# Patient Record
Sex: FEMALE | Race: WHITE | Employment: UNEMPLOYED | URBAN - METROPOLITAN AREA
[De-identification: names, ages, dates, MRNs, and addresses within clinical notes are randomized per-mention and may not be internally consistent; named-entity substitution may affect disease eponyms.]

---

## 2017-06-13 ENCOUNTER — OFFICE VISIT (OUTPATIENT)
Dept: OBGYN CLINIC | Age: 57
End: 2017-06-13

## 2017-06-13 VITALS — BODY MASS INDEX: 22.14 KG/M2 | DIASTOLIC BLOOD PRESSURE: 82 MMHG | SYSTOLIC BLOOD PRESSURE: 126 MMHG | WEIGHT: 129 LBS

## 2017-06-13 DIAGNOSIS — Z01.419 ENCOUNTER FOR GYNECOLOGICAL EXAMINATION WITHOUT ABNORMAL FINDING: Primary | ICD-10-CM

## 2017-06-13 RX ORDER — ESZOPICLONE 1 MG/1
1 TABLET, FILM COATED ORAL
Qty: 30 TAB | Refills: 2 | Status: SHIPPED | OUTPATIENT
Start: 2017-06-13 | End: 2019-10-15

## 2017-06-13 RX ORDER — DEXLANSOPRAZOLE 60 MG/1
CAPSULE, DELAYED RELEASE ORAL
COMMUNITY

## 2017-06-13 NOTE — PATIENT INSTRUCTIONS
Pelvic Exam: Care Instructions  Your Care Instructions    When your doctor examines all of your pelvic organs, it's called a pelvic exam. Two good reasons to have this kind of exam are to check for sexually transmitted infections (STIs) and to get a Pap test. A Pap test is also called a Pap smear. It checks for early changes that can lead to cancer of the cervix. Sometimes a pelvic exam is part of a regular checkup. In this case, you can do some things to make your test results as accurate as possible. · Try to schedule the exam when you don't have your period. · Don't use douches, tampons, or vaginal medicines, sprays, or powders for 24 hours before your exam.  · Don't have sex for 24 hours before your exam.  Other times, women have this kind of exam at any time of the month. This is because they have pelvic pain, bleeding, or discharge. Or they may have another pelvic problem. Before your exam, it's important to share some information with your doctor. For example, if you are a survivor of rape or sexual abuse, you can talk about any concerns you may have. Your doctor will also want to know if you are pregnant or use birth control. And he or she will want to hear about any problems, surgeries, or procedures you have had in your pelvic area. You will also need to tell your doctor when your last period was. Follow-up care is a key part of your treatment and safety. Be sure to make and go to all appointments, and call your doctor if you are having problems. It's also a good idea to know your test results and keep a list of the medicines you take. How is a pelvic exam done? · During a pelvic exam, you will:  ¨ Take off your clothes below the waist. You will get a paper or cloth cover to put over the lower half of your body. Cherylyn Peck on your back on an exam table. Your feet will be raised above you. Stirrups will support your feet. · The doctor will:  Javad Octave you to relax your knees.  Your knees need to lean out, toward the walls. ¨ Check the opening of your vagina for sores or swelling. ¨ Gently put a tool called a speculum into your vagina. It opens the vagina a little bit. You will feel some pressure. But if you are relaxed, it will not hurt. It lets your doctor see inside the vagina. ¨ Use a small brush, spatula, or swab to get a sample of cells, if you are having a Pap test or culture. The doctor then removes the speculum. ¨ Put on gloves and put one or two fingers of one hand into your vagina. The other hand goes on your lower belly. This lets your doctor feel your pelvic organs. You will probably feel some pressure. Try to stay relaxed. ¨ Put one gloved finger into your rectum and one into your vagina, if needed. This can also help check your pelvic organs. This exam takes about 10 minutes. At the end, you will get a washcloth or tissue to clean your vaginal area. It's normal to have some discharge after this exam. You can then get dressed. Some test results may be ready right away. But results from a culture or a Pap test may take several days or a few weeks. Why should you have a pelvic exam?  · You want to have recommended screening tests. This includes a Pap test.  · You think you have a vaginal infection. Signs include itching, burning, or unusual discharge. · You might have been exposed to a sexually transmitted infection (STI), such as chlamydia or herpes. · You have vaginal bleeding that is not part of your normal menstrual period. · You have pain in your belly or pelvis. · You have been sexually assaulted. A pelvic exam lets your doctor collect evidence and check for STIs. · You are pregnant. · You are having trouble getting pregnant. What are the risks of a pelvic exam?  There are no risks from a pelvic exam.  When should you call for help?   Watch closely for changes in your health, and be sure to contact your doctor if:  · You have heavy bleeding or discharge from your vagina after the exam.  Where can you learn more? Go to http://alisha-dominik.info/. Enter Z368 in the search box to learn more about \"Pelvic Exam: Care Instructions. \"  Current as of: October 13, 2016  Content Version: 11.2  © 0730-8368 [employer], [employer]. Care instructions adapted under license by [employer] (which disclaims liability or warranty for this information). If you have questions about a medical condition or this instruction, always ask your healthcare professional. [address] any warranty or liability for your use of this information.

## 2017-06-13 NOTE — MR AVS SNAPSHOT
Visit Information Date & Time Provider Department Dept. Phone Encounter #  
 9/45/6032 50:17 AM Vanessa Jimenez MD [facility] OB-GYN [address] [facility] [id number] Upcoming Health Maintenance Date Due Hepatitis C Screening 1960 PAP AKA CERVICAL CYTOLOGY 12/29/1981 BREAST CANCER SCRN MAMMOGRAM 12/29/2010 FOBT Q 1 YEAR AGE 50-75 12/29/2010 INFLUENZA AGE 9 TO ADULT 8/1/2017 Allergies as of 6/13/2017  Review Complete On: 6/13/2017 By: Andry Crews No Known Allergies Current Immunizations  Never Reviewed No immunizations on file. Not reviewed this visit You Were Diagnosed With   
  
 Codes Comments Encounter for gynecological examination without abnormal finding    -  Primary ICD-10-CM: X91.804 ICD-9-CM: V72.31 Vitals OB Status Smoking Status Postmenopausal Never Smoker Your Updated Medication List  
  
   
This list is accurate as of: 6/13/17 10:14 AM.  Always use your most recent med list.  
  
  
  
  
 aspirin 81 mg chewable tablet Take 1 Tab by mouth daily. DEXILANT 60 mg Cpdb Generic drug:  Dexlansoprazole Take  by mouth. omeprazole 20 mg capsule Commonly known as:  PriLOSEC Take 1 Cap by mouth daily. ondansetron 8 mg disintegrating tablet Commonly known as:  ZOFRAN ODT Take 1 Tab by mouth every eight (8) hours as needed for Nausea. PREMPRO 0.3-1.5 mg Tab Generic drug:  estrogen (conjugated)-medroxyPROGESTERone Take 1 Tab by mouth daily. RITALIN 10 mg tablet Generic drug:  methylphenidate Take 10 mg by mouth three (3) times daily. ZyrTEC 10 mg tablet Generic drug:  cetirizine Take 10 mg by mouth daily. Patient Instructions Pelvic Exam: Care Instructions Your Care Instructions When your doctor examines all of your pelvic organs, it's called a pelvic exam. Two good reasons to have this kind of exam are to check for sexually transmitted infections (STIs) and to get a Pap test. A Pap test is also called a Pap smear. It checks for early changes that can lead to cancer of the cervix. Sometimes a pelvic exam is part of a regular checkup. In this case, you can do some things to make your test results as accurate as possible. · Try to schedule the exam when you don't have your period. · Don't use douches, tampons, or vaginal medicines, sprays, or powders for 24 hours before your exam. 
· Don't have sex for 24 hours before your exam. 
Other times, women have this kind of exam at any time of the month. This is because they have pelvic pain, bleeding, or discharge. Or they may have another pelvic problem. Before your exam, it's important to share some information with your doctor. For example, if you are a survivor of rape or sexual abuse, you can talk about any concerns you may have. Your doctor will also want to know if you are pregnant or use birth control. And he or she will want to hear about any problems, surgeries, or procedures you have had in your pelvic area. You will also need to tell your doctor when your last period was. Follow-up care is a key part of your treatment and safety. Be sure to make and go to all appointments, and call your doctor if you are having problems. It's also a good idea to know your test results and keep a list of the medicines you take. How is a pelvic exam done? · During a pelvic exam, you will: ¨ Take off your clothes below the waist. You will get a paper or cloth cover to put over the lower half of your body. Jen Mcqueen on your back on an exam table. Your feet will be raised above you. Stirrups will support your feet. · The doctor will: ¨ Ask you to relax your knees. Your knees need to lean out, toward the walls. ¨ Check the opening of your vagina for sores or swelling. ¨ Gently put a tool called a speculum into your vagina. It opens the vagina a little bit. You will feel some pressure. But if you are relaxed, it will not hurt. It lets your doctor see inside the vagina. ¨ Use a small brush, spatula, or swab to get a sample of cells, if you are having a Pap test or culture. The doctor then removes the speculum. ¨ Put on gloves and put one or two fingers of one hand into your vagina. The other hand goes on your lower belly. This lets your doctor feel your pelvic organs. You will probably feel some pressure. Try to stay relaxed. ¨ Put one gloved finger into your rectum and one into your vagina, if needed. This can also help check your pelvic organs. This exam takes about 10 minutes. At the end, you will get a washcloth or tissue to clean your vaginal area. It's normal to have some discharge after this exam. You can then get dressed. Some test results may be ready right away. But results from a culture or a Pap test may take several days or a few weeks. Why should you have a pelvic exam? 
· You want to have recommended screening tests. This includes a Pap test. 
· You think you have a vaginal infection. Signs include itching, burning, or unusual discharge. · You might have been exposed to a sexually transmitted infection (STI), such as chlamydia or herpes. · You have vaginal bleeding that is not part of your normal menstrual period. · You have pain in your belly or pelvis. · You have been sexually assaulted. A pelvic exam lets your doctor collect evidence and check for STIs. · You are pregnant. · You are having trouble getting pregnant. What are the risks of a pelvic exam? 
There are no risks from a pelvic exam. 
When should you call for help? Watch closely for changes in your health, and be sure to contact your doctor if: 
· You have heavy bleeding or discharge from your vagina after the exam. 
Where can you learn more? Go to http://alisha-dominik.info/. Enter T015 in the search box to learn more about \"Pelvic Exam: Care Instructions. \" Current as of: October 13, 2016 Content Version: 11.2 © 8433-6851 [employer]. Care instructions adapted under license by [employer] (which disclaims liability or warranty for this information). If you have questions about a medical condition or this instruction, always ask your healthcare professional. [address] any warranty or liability for your use of this information. Introducing [facility]! Moe Amin introduces Youtego patient portal. Now you can access parts of your medical record, email your doctor's office, and request medication refills online. 1. In your internet browser, go to [email or web address] 2. Click on the First Time User? Click Here link in the Sign In box. You will see the New Member Sign Up page. 3. Enter your Youtego Access Code exactly as it appears below. You will not need to use this code after youve completed the sign-up process. If you do not sign up before the expiration date, you must request a new code. · Youtego Access Code: [email or web address] Expires: 9/11/2017 10:14 AM 
 
4. Enter the last four digits of your Social Security Number (xxxx) and Date of Birth (mm/dd/yyyy) as indicated and click Submit. You will be taken to the next sign-up page. 5. Create a Youtego ID. This will be your Youtego login ID and cannot be changed, so think of one that is secure and easy to remember. 6. Create a Youtego password. You can change your password at any time. 7. Enter your Password Reset Question and Answer. This can be used at a later time if you forget your password. 8. Enter your e-mail address. You will receive e-mail notification when new information is available in [address] 9. Click Sign Up. You can now view and download portions of your medical record. 10. Click the Download Summary menu link to download a portable copy of your medical information. If you have questions, please visit the Frequently Asked Questions section of the [employer] website. Remember, [employer] is NOT to be used for urgent needs. For medical emergencies, dial 911. Now available from your iPhone and Android! Please provide this summary of care documentation to your next provider. Your primary care clinician is listed as Danny Felty. If you have any questions after today's visit, please call [phone].

## 2017-06-13 NOTE — PROGRESS NOTES
Annual exam ages 40-58    Marck Allen is a No obstetric history on file. ,  64 y.o. female [facility] No LMP recorded. Patient is postmenopausal..    She presents for her annual checkup. She is having no significant problems. Wants to remain on current ERT; understands risks and benefits    Son now driving; age 12     Menstrual status:    Now menopausal      Sexual history:    She  reports that she currently engages in sexual activity and has had male partners. Medical conditions:    Since her last annual GYN exam about one year ago, she has not the following changes in her health history: none. Pap and Mammogram History:    Her most recent Pap smear was normal, obtained 1 year(s) ago. The patient had a recent mammogram   which was negative for malignancy. Breast Cancer History/Substance Abuse: negative    Osteoporosis History:    Family history does not include a first or second degree relative with osteopenia or osteoporosis. Past Medical History:   Diagnosis Date    ADHD (attention deficit hyperactivity disorder)     Delivery normal     GERD (gastroesophageal reflux disease)      Past Surgical History:   Procedure Laterality Date    HX BREAST AUGMENTATION      HX OTHER SURGICAL      tooth implant    HX OTHER SURGICAL      cerclage    HX OTHER SURGICAL      knee surgery    HX WISDOM TEETH EXTRACTION         Current Outpatient Prescriptions   Medication Sig Dispense Refill    Dexlansoprazole (DEXILANT) 60 mg CpDB Take  by mouth.  estrogen, conjugated,-medroxyPROGESTERone (PREMPRO) 0.3-1.5 mg per tablet Take 1 Tab by mouth daily.  methylphenidate (RITALIN) 10 mg tablet Take 10 mg by mouth three (3) times daily. Allergies: Review of patient's allergies indicates no known allergies. Tobacco History:  reports that she has never smoked. She does not have any smokeless tobacco history on file. Alcohol Abuse:  reports that she drinks alcohol.   Drug Abuse: reports that she does not use illicit drugs. Family Medical/Cancer History: No family history on file.      Review of Systems - History obtained from the patient  Constitutional: negative for weight loss, fever, night sweats  HEENT: negative for hearing loss, earache, congestion, snoring, sorethroat  CV: negative for chest pain, palpitations, edema  Resp: negative for cough, shortness of breath, wheezing  GI: negative for change in bowel habits, abdominal pain, black or bloody stools  : negative for frequency, dysuria, hematuria, vaginal discharge  MSK: negative for back pain, joint pain, muscle pain  Breast: negative for breast lumps, nipple discharge, galactorrhea  Skin :negative for itching, rash, hives  Neuro: negative for dizziness, headache, confusion, weakness  Psych: negative for anxiety, depression, change in mood  Heme/lymph: negative for bleeding, bruising, pallor    Physical Exam    Visit Vitals    /82 (BP 1 Location: Left arm, BP Patient Position: Sitting)    Wt 129 lb (58.5 kg)    BMI 22.14 kg/m2       Constitutional  · Appearance: well-nourished, well developed, alert, in no acute distress    HENT  · Head and Face: appears normal    Neck  · Inspection/Palpation: normal appearance, no masses or tenderness  · Lymph Nodes: no lymphadenopathy present  · Thyroid: gland size normal, nontender, no nodules or masses present on palpation    Chest  · Respiratory Effort: breathing unlabored      Breasts  · Inspection of Breasts: breasts symmetrical, no skin changes, no discharge present, nipple appearance normal, no skin retraction present  · Palpation of Breasts and Axillae: no masses present on palpation, no breast tenderness  · Axillary Lymph Nodes: no lymphadenopathy present    Gastrointestinal  · Abdominal Examination: abdomen non-tender to palpation, normal bowel sounds, no masses present  · Liver and spleen: no hepatomegaly present, spleen not palpable  · Hernias: no hernias identified    Skin  · General Inspection: no rash, no lesions identified    Neurologic/Psychiatric  · Mental Status:  · Orientation: grossly oriented to person, place and time  · Mood and Affect: mood normal, affect appropriate    Genitourinary  · External Genitalia: normal appearance for age, no discharge present, no tenderness present, no inflammatory lesions present, no masses present, no atrophy present  · Vagina: normal vaginal vault without central or paravaginal defects, no discharge present, no inflammatory lesions present, no masses present  · Bladder: non-tender to palpation  · Urethra: appears normal  · Cervix: normal   · Uterus: normal size, shape and consistency  · Adnexa: no adnexal tenderness present, no adnexal masses present  · Perineum: perineum within normal limits, no evidence of trauma, no rashes or skin lesions present  · Anus: anus within normal limits, no hemorrhoids present  · Inguinal Lymph Nodes: no lymphadenopathy present    Assessment:  Routine gynecologic examination  Her current medical status is satisfactory with no evidence of significant gynecologic issues.     Plan:  Counseled re: diet, exercise, healthy lifestyle  Return for yearly wellness visits  Rec annual mammogram

## 2017-06-16 LAB
CYTOLOGIST CVX/VAG CYTO: NORMAL
CYTOLOGY CVX/VAG DOC THIN PREP: NORMAL
DX ICD CODE: NORMAL
LABCORP, 190119: NORMAL
Lab: NORMAL
Lab: NORMAL
OTHER STN SPEC: NORMAL
PATH REPORT.FINAL DX SPEC: NORMAL
STAT OF ADQ CVX/VAG CYTO-IMP: NORMAL

## 2018-07-27 ENCOUNTER — TELEPHONE (OUTPATIENT)
Dept: OBGYN CLINIC | Age: 58
End: 2018-07-27

## 2018-07-27 NOTE — TELEPHONE ENCOUNTER
Call received at 1:48PM      62year old patient last seen in the office on 6/13/117. Patient has appointment scheduled on 8/10/18 for AE. Patient calling to request a refill on her Prempro to get her to her appointment. Prescription sent as per MD order to patient preferred pharmacy for one month supply. Patient advised of need to keep appointment in order to get further refills on the prescription. Patient verbalized understanding.

## 2018-08-21 ENCOUNTER — OFFICE VISIT (OUTPATIENT)
Dept: OBGYN CLINIC | Age: 58
End: 2018-08-21

## 2018-08-21 VITALS
HEIGHT: 65 IN | BODY MASS INDEX: 21.63 KG/M2 | DIASTOLIC BLOOD PRESSURE: 78 MMHG | WEIGHT: 129.8 LBS | SYSTOLIC BLOOD PRESSURE: 124 MMHG

## 2018-08-21 DIAGNOSIS — Z01.419 WELL WOMAN EXAM: Primary | ICD-10-CM

## 2018-08-21 NOTE — PROGRESS NOTES
Annual exam ages 40-58    Marlyce Opitz is a No obstetric history on file. ,  62 y.o. female [facility] No LMP recorded. Patient is postmenopausal..    She presents for her annual checkup. She is having no significant problems. 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 Contraception:    The current method of family planning is none. Sexual history:    She  reports that she currently engages in sexual activity and has had male partners. She reports using the following method of birth control/protection: None. Medical conditions:    Since her last annual GYN exam about one year ago, she has not the following changes in her health history: none. Pap and Mammogram History:    Her most recent Pap smear was normal, obtained 1 year(s) ago. The patient has not had a recent mammogram.    Breast Cancer History/Substance Abuse: negative    Osteoporosis History:    Family history does not include a first or second degree relative with osteopenia or osteoporosis. Past Medical History:   Diagnosis Date    ADHD (attention deficit hyperactivity disorder)     Delivery normal     GERD (gastroesophageal reflux disease)      Past Surgical History:   Procedure Laterality Date    HX BREAST AUGMENTATION      HX OTHER SURGICAL      tooth implant    HX OTHER SURGICAL      cerclage    HX OTHER SURGICAL      knee surgery    HX WISDOM TEETH EXTRACTION         Current Outpatient Prescriptions   Medication Sig Dispense Refill    estrogen, conjugated,-medroxyPROGESTERone (PREMPRO) 0.3-1.5 mg tab Take 1 Tab by mouth daily. 30 Tab 0    Dexlansoprazole (DEXILANT) 60 mg CpDB Take  by mouth.  eszopiclone (LUNESTA) 1 mg tablet Take 1 Tab by mouth nightly as needed for Sleep. Max Daily Amount: 1 mg. 30 Tab 2    methylphenidate (RITALIN) 10 mg tablet Take 10 mg by mouth three (3) times daily. Allergies: Review of patient's allergies indicates no known allergies. Tobacco History:  reports that she has never smoked. She has never used smokeless tobacco.  Alcohol Abuse:  reports that she drinks alcohol. Drug Abuse:  reports that she does not use illicit drugs.     Family Medical/Cancer History:   Family History   Problem Relation Age of Onset    No Known Problems Mother     No Known Problems Father         Review of Systems - History obtained from the patient  Constitutional: negative for weight loss, fever, night sweats  HEENT: negative for hearing loss, earache, congestion, snoring, sorethroat  CV: negative for chest pain, palpitations, edema  Resp: negative for cough, shortness of breath, wheezing  GI: negative for change in bowel habits, abdominal pain, black or bloody stools  : negative for frequency, dysuria, hematuria, vaginal discharge  MSK: negative for back pain, joint pain, muscle pain  Breast: negative for breast lumps, nipple discharge, galactorrhea  Skin :negative for itching, rash, hives  Neuro: negative for dizziness, headache, confusion, weakness  Psych: negative for anxiety, depression, change in mood  Heme/lymph: negative for bleeding, bruising, pallor    Physical Exam    Visit Vitals    /78 (BP 1 Location: Left arm, BP Patient Position: Sitting)    Ht 5' 4.5\" (1.638 m)    Wt 129 lb 12.8 oz (58.9 kg)    BMI 21.94 kg/m2       Constitutional  · Appearance: well-nourished, well developed, alert, in no acute distress    HENT  · Head and Face: appears normal    Chest  · Respiratory Effort: breathing unlabored      Breasts  · Inspection of Breasts: breasts symmetrical, no skin changes, no discharge present, nipple appearance normal, no skin retraction present  · Palpation of Breasts and Axillae: no masses present on palpation, no breast tenderness  · Axillary Lymph Nodes: no lymphadenopathy present    Gastrointestinal  · Abdominal Examination: abdomen non-tender to palpation, normal bowel sounds, no masses present  · Liver and spleen: no hepatomegaly present, spleen not palpable  · Hernias: no hernias identified    Skin  · General Inspection: no rash, no lesions identified    Neurologic/Psychiatric  · Mental Status:  · Orientation: grossly oriented to person, place and time  · Mood and Affect: mood normal, affect appropriate    Genitourinary  · External Genitalia: normal appearance for age, no discharge present, no tenderness present, no inflammatory lesions present, no masses present, no atrophy present  · Vagina: normal vaginal vault without central or paravaginal defects, no discharge present, no inflammatory lesions present, no masses present  · Bladder: non-tender to palpation  · Urethra: appears normal  · Cervix: normal   · Uterus: normal size, shape and consistency  · Adnexa: no adnexal tenderness present, no adnexal masses present  · Perineum: perineum within normal limits, no evidence of trauma, no rashes or skin lesions present  · Anus: anus within normal limits, no hemorrhoids present  · Inguinal Lymph Nodes: no lymphadenopathy present    Assessment:  Routine gynecologic examination  Her current medical status is satisfactory with no evidence of significant gynecologic issues.     Plan:  Counseled re: diet, exercise, healthy lifestyle  Return for yearly wellness visits  Rec annual mammogram

## 2019-09-04 RX ORDER — CONJUGATED ESTROGENS AND MEDROXYPROGESTERONE ACETATE .3; 1.5 MG/1; MG/1
TABLET, SUGAR COATED ORAL
Qty: 1 TAB | Refills: 1 | Status: SHIPPED | OUTPATIENT
Start: 2019-09-04 | End: 2019-10-15 | Stop reason: SDUPTHER

## 2019-09-04 NOTE — TELEPHONE ENCOUNTER
62year old patient last seen in the office on 8/21/18 and has next appointment on 10/15/19. Prescription refill sent as per MD order to patient preferred pharmacy to get patient to her scheduled appointment.

## 2019-09-19 ENCOUNTER — TELEPHONE (OUTPATIENT)
Dept: OBGYN CLINIC | Age: 59
End: 2019-09-19

## 2019-09-19 NOTE — TELEPHONE ENCOUNTER
Patient calling stating that she had a refill of prempro sent in, but states that she usually gets a 90 day supply. Patient advised that when the AE appt is scheduled, we can only refill until the appt time and once she is seen by the MD she can get a 90 day supply. Patient verbalized understanding.

## 2019-10-13 ENCOUNTER — HOSPITAL ENCOUNTER (EMERGENCY)
Age: 59
Discharge: HOME | End: 2019-10-13
Attending: EMERGENCY MEDICINE
Payer: COMMERCIAL

## 2019-10-13 ENCOUNTER — APPOINTMENT (OUTPATIENT)
Dept: CT IMAGING | Age: 59
End: 2019-10-13
Attending: EMERGENCY MEDICINE
Payer: COMMERCIAL

## 2019-10-13 VITALS
OXYGEN SATURATION: 99 % | DIASTOLIC BLOOD PRESSURE: 78 MMHG | TEMPERATURE: 98.1 F | RESPIRATION RATE: 16 BRPM | HEART RATE: 60 BPM | WEIGHT: 136.69 LBS | SYSTOLIC BLOOD PRESSURE: 139 MMHG | BODY MASS INDEX: 23.1 KG/M2

## 2019-10-13 DIAGNOSIS — R42 VERTIGO: Primary | ICD-10-CM

## 2019-10-13 LAB
ALBUMIN SERPL-MCNC: 4 G/DL (ref 3.5–5)
ALBUMIN/GLOB SERPL: 1.2 {RATIO} (ref 1.1–2.2)
ALP SERPL-CCNC: 58 U/L (ref 45–117)
ALT SERPL-CCNC: 20 U/L (ref 12–78)
ANION GAP SERPL CALC-SCNC: 12 MMOL/L (ref 5–15)
AST SERPL-CCNC: 29 U/L (ref 15–37)
BASOPHILS # BLD: 0 K/UL (ref 0–0.1)
BASOPHILS NFR BLD: 0 % (ref 0–1)
BILIRUB SERPL-MCNC: 0.8 MG/DL (ref 0.2–1)
BUN SERPL-MCNC: 16 MG/DL (ref 6–20)
BUN/CREAT SERPL: 28 (ref 12–20)
CALCIUM SERPL-MCNC: 8.3 MG/DL (ref 8.5–10.1)
CHLORIDE SERPL-SCNC: 96 MMOL/L (ref 97–108)
CO2 SERPL-SCNC: 25 MMOL/L (ref 21–32)
CREAT SERPL-MCNC: 0.58 MG/DL (ref 0.55–1.02)
DIFFERENTIAL METHOD BLD: ABNORMAL
EOSINOPHIL # BLD: 0.1 K/UL (ref 0–0.4)
EOSINOPHIL NFR BLD: 1 % (ref 0–7)
ERYTHROCYTE [DISTWIDTH] IN BLOOD BY AUTOMATED COUNT: 11.8 % (ref 11.5–14.5)
GLOBULIN SER CALC-MCNC: 3.3 G/DL (ref 2–4)
GLUCOSE SERPL-MCNC: 103 MG/DL (ref 65–100)
HCT VFR BLD AUTO: 42.9 % (ref 35–47)
HGB BLD-MCNC: 14.2 G/DL (ref 11.5–16)
IMM GRANULOCYTES # BLD AUTO: 0 K/UL (ref 0–0.04)
IMM GRANULOCYTES NFR BLD AUTO: 0 % (ref 0–0.5)
LYMPHOCYTES # BLD: 1.2 K/UL (ref 0.8–3.5)
LYMPHOCYTES NFR BLD: 15 % (ref 12–49)
MCH RBC QN AUTO: 28.8 PG (ref 26–34)
MCHC RBC AUTO-ENTMCNC: 33.1 G/DL (ref 30–36.5)
MCV RBC AUTO: 87 FL (ref 80–99)
MONOCYTES # BLD: 0.3 K/UL (ref 0–1)
MONOCYTES NFR BLD: 4 % (ref 5–13)
NEUTS SEG # BLD: 6.4 K/UL (ref 1.8–8)
NEUTS SEG NFR BLD: 80 % (ref 32–75)
NRBC # BLD: 0 K/UL (ref 0–0.01)
NRBC BLD-RTO: 0 PER 100 WBC
PLATELET # BLD AUTO: 306 K/UL (ref 150–400)
PMV BLD AUTO: 9.4 FL (ref 8.9–12.9)
POTASSIUM SERPL-SCNC: 4.1 MMOL/L (ref 3.5–5.1)
PROT SERPL-MCNC: 7.3 G/DL (ref 6.4–8.2)
RBC # BLD AUTO: 4.93 M/UL (ref 3.8–5.2)
SODIUM SERPL-SCNC: 133 MMOL/L (ref 136–145)
WBC # BLD AUTO: 8 K/UL (ref 3.6–11)

## 2019-10-13 PROCEDURE — 99284 EMERGENCY DEPT VISIT MOD MDM: CPT

## 2019-10-13 PROCEDURE — 74011250637 HC RX REV CODE- 250/637: Performed by: EMERGENCY MEDICINE

## 2019-10-13 PROCEDURE — 36415 COLL VENOUS BLD VENIPUNCTURE: CPT

## 2019-10-13 PROCEDURE — 80053 COMPREHEN METABOLIC PANEL: CPT

## 2019-10-13 PROCEDURE — 96374 THER/PROPH/DIAG INJ IV PUSH: CPT

## 2019-10-13 PROCEDURE — 85025 COMPLETE CBC W/AUTO DIFF WBC: CPT

## 2019-10-13 PROCEDURE — 74011250636 HC RX REV CODE- 250/636: Performed by: EMERGENCY MEDICINE

## 2019-10-13 PROCEDURE — 70450 CT HEAD/BRAIN W/O DYE: CPT

## 2019-10-13 RX ORDER — MECLIZINE HCL 12.5 MG 12.5 MG/1
50 TABLET ORAL
Status: COMPLETED | OUTPATIENT
Start: 2019-10-13 | End: 2019-10-13

## 2019-10-13 RX ORDER — MECLIZINE HCL 12.5 MG 12.5 MG/1
25 TABLET ORAL
Qty: 15 TAB | Refills: 0 | Status: SHIPPED | OUTPATIENT
Start: 2019-10-13 | End: 2019-10-23

## 2019-10-13 RX ORDER — MECLIZINE HCL 12.5 MG 12.5 MG/1
25 TABLET ORAL
COMMUNITY
End: 2019-10-13

## 2019-10-13 RX ORDER — ONDANSETRON 4 MG/1
4 TABLET, ORALLY DISINTEGRATING ORAL
Status: COMPLETED | OUTPATIENT
Start: 2019-10-13 | End: 2019-10-13

## 2019-10-13 RX ORDER — ONDANSETRON 4 MG/1
4 TABLET, ORALLY DISINTEGRATING ORAL
Qty: 10 TAB | Refills: 0 | Status: SHIPPED | OUTPATIENT
Start: 2019-10-13 | End: 2020-10-16

## 2019-10-13 RX ORDER — ONDANSETRON 2 MG/ML
4 INJECTION INTRAMUSCULAR; INTRAVENOUS
Status: COMPLETED | OUTPATIENT
Start: 2019-10-13 | End: 2019-10-13

## 2019-10-13 RX ORDER — PROMETHAZINE HYDROCHLORIDE 25 MG/1
25 SUPPOSITORY RECTAL
Qty: 12 SUPPOSITORY | Refills: 0 | Status: SHIPPED | OUTPATIENT
Start: 2019-10-13 | End: 2019-10-20

## 2019-10-13 RX ADMIN — ONDANSETRON 4 MG: 2 INJECTION INTRAMUSCULAR; INTRAVENOUS at 15:01

## 2019-10-13 RX ADMIN — SODIUM CHLORIDE 1000 ML: 900 INJECTION, SOLUTION INTRAVENOUS at 15:01

## 2019-10-13 RX ADMIN — ONDANSETRON 4 MG: 4 TABLET, ORALLY DISINTEGRATING ORAL at 15:03

## 2019-10-13 RX ADMIN — MECLIZINE 50 MG: 12.5 TABLET ORAL at 15:40

## 2019-10-13 NOTE — ED PROVIDER NOTES
59-year-old white female past medical history ADD, GERD, and vertigo who sees Dr. Angela Nj prevents [facility] complaining of acute on having another vertigo attack. Patient states his symptoms were acute in onset this morning at approximately 9:30 AM.  She took her meclizine but thinks she may have vomited it back up. Symptoms have continued since then, room spinning denies numbness tingling facial droop slurred speech. Patient states she is vomited approximately 5 times, has been afebrile no blood in the vomit unable to tolerate p.o. due to the nausea.     pt denies HA, vison changes, diff swallowing, CP, SOB, Abd pain, F/Ch, N/V, D/Cons or other current systemic complaints           Past Medical History:   Diagnosis Date    ADHD (attention deficit hyperactivity disorder)     Delivery normal     GERD (gastroesophageal reflux disease)        Past Surgical History:   Procedure Laterality Date    HX BREAST AUGMENTATION      HX OTHER SURGICAL      tooth implant    HX OTHER SURGICAL      cerclage    HX OTHER SURGICAL      knee surgery    HX WISDOM TEETH EXTRACTION           Family History:   Problem Relation Age of Onset    No Known Problems Mother     No Known Problems Father        Social History     Socioeconomic History    Marital status:      Spouse name: Not on file    Number of children: Not on file    Years of education: Not on file    Highest education level: Not on file   Occupational History    Not on file   Social Needs    Financial resource strain: Not on file    Food insecurity:     Worry: Not on file     Inability: Not on file    Transportation needs:     Medical: Not on file     Non-medical: Not on file   Tobacco Use    Smoking status: Never Smoker    Smokeless tobacco: Never Used   Substance and Sexual Activity    Alcohol use: Yes     Comment: socially    Drug use: No    Sexual activity: Yes     Partners: Male     Birth control/protection: None   Lifestyle    Physical activity: Days per week: Not on file     Minutes per session: Not on file    Stress: Not on file   Relationships    Social connections:     Talks on phone: Not on file     Gets together: Not on file     Attends Sabianism service: Not on file     Active member of club or organization: Not on file     Attends meetings of clubs or organizations: Not on file     Relationship status: Not on file    Intimate partner violence:     Fear of current or ex partner: Not on file     Emotionally abused: Not on file     Physically abused: Not on file     Forced sexual activity: Not on file   Other Topics Concern    Not on file   Social History Narrative    Not on file         ALLERGIES: Patient has no known allergies. Review of Systems   Eyes: Positive for visual disturbance. Gastrointestinal: Positive for nausea and vomiting. Neurological: Positive for dizziness. All other systems reviewed and are negative. Vitals:    10/13/19 1453 10/13/19 1514 10/13/19 1600   BP: (!) 149/92 (!) 149/92 147/89   Pulse: (!) 57     Resp: 16     Temp: 98.1 °F (36.7 °C)     SpO2: 100%  100%   Weight: 62 kg (136 lb 11 oz)              Physical Exam   Constitutional: She is oriented to person, place, and time. She appears well-developed and well-nourished. Uncomfortable appearing, AxOx4, speaking in complete sentences    gcs = 15       HENT:   Head: Normocephalic and atraumatic. Mouth/Throat: Oropharynx is clear and moist. No oropharyngeal exudate. Cn intact    No facial droop/ slurred speech/ tongue deviation   Eyes: Pupils are equal, round, and reactive to light. Conjunctivae are normal. Right eye exhibits no chemosis, no discharge, no exudate and no hordeolum. No foreign body present in the right eye. Left eye exhibits no chemosis, no discharge, no exudate and no hordeolum. No foreign body present in the left eye. No scleral icterus. Right eye exhibits normal extraocular motion and no nystagmus. Left eye exhibits nystagmus.  Left eye exhibits normal extraocular motion. Right pupil is round and reactive. Left pupil is round and reactive. Pupils are equal.   Noted L solano nystragmus   Neck: Normal range of motion. Neck supple. No JVD present. No tracheal deviation present. Cardiovascular: Normal rate, regular rhythm, normal heart sounds and intact distal pulses. Exam reveals no gallop and no friction rub. No murmur heard. Pulmonary/Chest: Effort normal and breath sounds normal. No respiratory distress. She has no wheezes. She has no rales. She exhibits no tenderness. Abdominal: Soft. Bowel sounds are normal. There is no tenderness. There is no rebound and no guarding. Genitourinary: No vaginal discharge found. Genitourinary Comments: Pt denies urinary/ vaginal complaints   Musculoskeletal: Normal range of motion. She exhibits no edema, tenderness or deformity. Neurological: She is alert and oriented to person, place, and time. No cranial nerve deficit. She exhibits normal muscle tone. Coordination normal.   pt has motor/ CV/ Sensation grossly intact to all extremities, R = L in strength;   Skin: Skin is warm and dry. Capillary refill takes less than 2 seconds. No rash noted. No erythema. No pallor. Psychiatric: She has a normal mood and affect. Her behavior is normal. Thought content normal.   Nursing note and vitals reviewed. MDM       Procedures     Chief Complaint   Patient presents with    Dizziness    Vomiting       3:54 PM  The patients presenting problems have been discussed, and they are in agreement with the care plan formulated and outlined with them. I have encouraged them to ask questions as they arise throughout their visit.     MEDICATIONS GIVEN:  Medications   meclizine (ANTIVERT) tablet 50 mg (50 mg Oral Given 10/13/19 1540)   ondansetron (ZOFRAN) injection 4 mg (4 mg IntraVENous Given 10/13/19 1501)   ondansetron (ZOFRAN ODT) tablet 4 mg (4 mg Oral Given 10/13/19 1503)   sodium chloride 0.9 % bolus infusion 1,000 mL (0 mL IntraVENous IV Completed 10/13/19 5015)       LABS REVIEWED:  Labs Reviewed   METABOLIC PANEL, COMPREHENSIVE - Abnormal; Notable for the following components:       Result Value    Sodium 133 (*)     Chloride 96 (*)     Glucose 103 (*)     BUN/Creatinine ratio 28 (*)     Calcium 8.3 (*)     All other components within normal limits   CBC WITH AUTOMATED DIFF - Abnormal; Notable for the following components:    NEUTROPHILS 80 (*)     MONOCYTES 4 (*)     All other components within normal limits   SAMPLES BEING HELD       RADIOLOGY RESULTS:  The following have been ordered and reviewed:  _____________________________________________________________________  _____________________________________________________________________    PROCEDURES:        CONSULTATIONS:       PROGRESS NOTES:      DIAGNOSIS:    1. Vertigo        PLAN:  1- NPPV - zofran rx/ phenergan supp/ 'has meclizine at home';       ED COURSE: The patients hospital course has been uncomplicated. 3:54 PM  'havent vomited again'; will give meclizine;     4:55 PM  'feels better - will you do an eply?';        5:04 PM no more nystagmus/ unable to elicit;   Corbin Jean-Baptiste's  results have been reviewed with her. She has been counseled regarding her diagnosis. She verbally conveys understanding and agreement of the signs, symptoms, diagnosis, treatment and prognosis and additionally agrees to Call/ Arrange follow up as recommended with Dr Katja Penn MD in 24 - 48 hours. She also agrees with the care-plan and conveys that all of her questions have been answered. I have also put together some discharge instructions for her that include: 1) educational information regarding their diagnosis, 2) how to care for their diagnosis at home, as well a 3) list of reasons why they would want to return to the ED prior to their follow-up appointment, should their condition change or for concerns.

## 2019-10-13 NOTE — ED NOTES
Pt d/c'd home. IV d/c'd cath intact. Pt given d/c instructions, rx have been called in to pharmacy. Pt vss. Pt taken to care via w/c by nurse in stable condition.

## 2019-10-13 NOTE — DISCHARGE INSTRUCTIONS
Patient Education        Epley Maneuver at Home for Vertigo: Exercises  Introduction  Vertigo is a spinning or whirling sensation when you move your head. Your doctor may have moved you in different positions to help your vertigo get better faster. This is called the Epley maneuver. Your doctor also may have asked you to do these exercises at home. Do the exercises as often as your doctor recommends. If your vertigo is getting worse, your doctor may have you change the exercise or stop it. Step 1  Step 1    1. Sit on the edge of a bed or sofa. Step 2    1. Turn your head 45 degrees in the direction your doctor told you to. This should be toward the ear that causes the most vertigo for you. In this picture, the woman is turning toward her left ear. Step 3    1. Tilt yourself backward until you are lying on your back. Your head should still be at a 45-degree turn. Your head should be about midway between looking straight ahead and looking out to your side. Hold for 30 seconds. If you have vertigo, stay in this position until it stops. Step 4    1. Turn your head 90 degrees toward the ear that has the least vertigo. In this picture, the woman is turning to the right because she has vertigo on her left side. The point of your chin should be raised and over your shoulder. Hold for 30 seconds. Step 5    1. Roll onto the side with the least vertigo. You should now be looking at the floor. Hold for 30 seconds. Follow-up care is a key part of your treatment and safety. Be sure to make and go to all appointments, and call your doctor if you are having problems. It's also a good idea to know your test results and keep a list of the medicines you take. Where can you learn more? Go to http://alisha-dominik.info/. Enter 470 [id number] in the search box to learn more about \"Epley Maneuver at Home for Vertigo: Exercises. \"  Current as of: March 28, 2019  Content Version: 12.2  © 6714-9333 [employer], Incorporated. Care instructions adapted under license by [employer] (which disclaims liability or warranty for this information). If you have questions about a medical condition or this instruction, always ask your healthcare professional. [address] any warranty or liability for your use of this information. Patient Education        Vertigo: Care Instructions  Your Care Instructions    Vertigo is the feeling that you or your surroundings are moving when there is no actual movement. It is often described as a feeling of spinning, whirling, falling, or tilting. Vertigo may make you vomit or feel nauseated. You may have trouble standing or walking and may lose your balance. Vertigo is often related to an inner ear problem, but it can have other more serious causes. If vertigo continues, you may need more tests to find its cause. Follow-up care is a key part of your treatment and safety. Be sure to make and go to all appointments, and call your doctor if you are having problems. It's also a good idea to know your test results and keep a list of the medicines you take. How can you care for yourself at home? · Do not lie flat on your back. Prop yourself up slightly. This may reduce the spinning feeling. Keep your eyes open. · Move slowly so that you do not fall. · If your doctor recommends medicine, take it exactly as directed. · Do not drive while you are having vertigo. Certain exercises, called [place]-Daroff exercises, can help decrease vertigo. To do [place]-Daroff exercises:  · Sit on the edge of a bed or sofa and quickly lie down on the side that causes the worst vertigo. Lie on your side with your ear down. · Stay in this position for at least 30 seconds or until the vertigo goes away. · Sit up. If this causes vertigo, wait for it to stop. · Repeat the procedure on the other side. · Repeat this 10 times.  Do these exercises 2 times a day until the vertigo is gone.  When should you call for help? Call 911 anytime you think you may need emergency care. For example, call if:    · You passed out (lost consciousness).     · You have symptoms of a stroke. These may include:  ? Sudden numbness, tingling, weakness, or loss of movement in your face, arm, or leg, especially on only one side of your body. ? Sudden vision changes. ? Sudden trouble speaking. ? Sudden confusion or trouble understanding simple statements. ? Sudden problems with walking or balance. ? A sudden, severe headache that is different from past headaches.    Call your doctor now or seek immediate medical care if:    · Vertigo occurs with a fever, a headache, or ringing in your ears.     · You have new or increased nausea and vomiting.    Watch closely for changes in your health, and be sure to contact your doctor if:    · Vertigo gets worse or happens more often.     · Vertigo has not gotten better after 2 weeks. Where can you learn more? Go to http://alisha-dominik.info/. Enter O387 in the search box to learn more about \"Vertigo: Care Instructions. \"  Current as of: October 21, 2018  Content Version: 12.2  © 4810-6005 [employer]. Care instructions adapted under license by [employer] (which disclaims liability or warranty for this information). If you have questions about a medical condition or this instruction, always ask your healthcare professional. [place] [address] any warranty or liability for your use of this information.

## 2019-10-15 ENCOUNTER — OFFICE VISIT (OUTPATIENT)
Dept: OBGYN CLINIC | Age: 59
End: 2019-10-15

## 2019-10-15 VITALS
SYSTOLIC BLOOD PRESSURE: 134 MMHG | DIASTOLIC BLOOD PRESSURE: 70 MMHG | WEIGHT: 129 LBS | BODY MASS INDEX: 21.49 KG/M2 | HEIGHT: 65 IN

## 2019-10-15 DIAGNOSIS — Z01.419 WELL WOMAN EXAM WITH ROUTINE GYNECOLOGICAL EXAM: Primary | ICD-10-CM

## 2019-10-15 NOTE — PATIENT INSTRUCTIONS

## 2019-10-15 NOTE — PROGRESS NOTES
Annual exam ages postmenopausal    Shawn Orozco is a No obstetric history on file. ,  62 y.o. female [facility] No LMP recorded. Patient is postmenopausal..    She presents for her annual checkup. She is having no significant problems. Both parents now   Wants to remain on ERT  Daughter now 12       Menstrual status:    Now menopausal      Sexual history:    She  reports that she currently engages in sexual activity and has had partner(s) who are Male. She reports using the following method of birth control/protection: None. Medical conditions:    Since her last annual GYN exam about one year ago, she has not the following changes in her health history: none. Pap and Mammogram History:    Her most recent Pap smear was normal, obtained 1 year(s) ago. The patient has not had a recent mammogram.    Breast Cancer History/Substance Abuse: negative    Osteoporosis History:    Family history does not include a first or second degree relative with osteopenia or osteoporosis. Past Medical History:   Diagnosis Date    ADHD (attention deficit hyperactivity disorder)     Delivery normal     GERD (gastroesophageal reflux disease)     Vertigo      Past Surgical History:   Procedure Laterality Date    HX BREAST AUGMENTATION      HX OTHER SURGICAL      tooth implant    HX OTHER SURGICAL      cerclage    HX OTHER SURGICAL      knee surgery    HX WISDOM TEETH EXTRACTION         Current Outpatient Medications   Medication Sig Dispense Refill    ondansetron (ZOFRAN ODT) 4 mg disintegrating tablet Take 1 Tab by mouth every eight (8) hours as needed for Nausea. 10 Tab 0    promethazine (PHENERGAN) 25 mg suppository Insert 1 Suppository into rectum every six (6) hours as needed for Nausea for up to 7 days. 12 Suppository 0    meclizine (ANTIVERT) 12.5 mg tablet Take 2 Tabs by mouth three (3) times daily as needed for Dizziness for up to 10 days.  15 Tab 0    PREMPRO 0.3-1.5 mg tab TAKE 1 TABLET BY MOUTH EVERY DAY 1 Tab 1    Dexlansoprazole (DEXILANT) 60 mg CpDB Take  by mouth.  methylphenidate (RITALIN) 10 mg tablet Take 10 mg by mouth three (3) times daily.  eszopiclone (LUNESTA) 1 mg tablet Take 1 Tab by mouth nightly as needed for Sleep. Max Daily Amount: 1 mg. 30 Tab 2     Allergies: Patient has no known allergies. Tobacco History:  reports that she has never smoked. She has never used smokeless tobacco.  Alcohol Abuse:  reports that she drinks alcohol. Drug Abuse:  reports that she does not use drugs.     Family Medical/Cancer History:   Family History   Problem Relation Age of Onset    No Known Problems Mother     No Known Problems Father         Review of Systems - History obtained from the patient  Constitutional: negative for weight loss, fever, night sweats  HEENT: negative for hearing loss, earache, congestion, snoring, sorethroat  CV: negative for chest pain, palpitations, edema  Resp: negative for cough, shortness of breath, wheezing  GI: negative for change in bowel habits, abdominal pain, black or bloody stools  : negative for frequency, dysuria, hematuria, vaginal discharge  MSK: negative for back pain, joint pain, muscle pain  Breast: negative for breast lumps, nipple discharge, galactorrhea  Skin :negative for itching, rash, hives  Neuro: negative for dizziness, headache, confusion, weakness  Psych: negative for anxiety, depression, change in mood  Heme/lymph: negative for bleeding, bruising, pallor    Physical Exam    Visit Vitals  Ht 5' 4.5\" (1.638 m)   Wt 129 lb (58.5 kg)   BMI 21.80 kg/m²       Constitutional  · Appearance: well-nourished, well developed, alert, in no acute distress    HENT  · Head and Face: appears normal    Chest  · Respiratory Effort: breathing unlabored      Breasts  · Inspection of Breasts: breasts symmetrical, no skin changes, no discharge present, nipple appearance normal, no skin retraction present  · Palpation of Breasts and Axillae: no masses present on palpation, no breast tenderness  · Axillary Lymph Nodes: no lymphadenopathy present    Gastrointestinal  · Abdominal Examination: abdomen non-tender to palpation, normal bowel sounds, no masses present  · Liver and spleen: no hepatomegaly present, spleen not palpable  · Hernias: no hernias identified    Skin  · General Inspection: no rash, no lesions identified    Neurologic/Psychiatric  · Mental Status:  · Orientation: grossly oriented to person, place and time  · Mood and Affect: mood normal, affect appropriate    Genitourinary  · External Genitalia: normal appearance for age, no discharge present, no tenderness present, no inflammatory lesions present, no masses present, no atrophy present  · Vagina: normal vaginal vault without central or paravaginal defects, no discharge present, no inflammatory lesions present, no masses present  · Bladder: non-tender to palpation  · Urethra: appears normal  · Cervix: normal   · Uterus: normal size, shape and consistency  · Adnexa: no adnexal tenderness present, no adnexal masses present  · Perineum: perineum within normal limits, no evidence of trauma, no rashes or skin lesions present  · Anus: anus within normal limits, no hemorrhoids present  · Inguinal Lymph Nodes: no lymphadenopathy present    Assessment:  Routine gynecologic examination  Her current medical status is satisfactory with no evidence of significant gynecologic issues.     Plan:  Script prempro  Pap  today  Counseled re: diet, exercise, healthy lifestyle  Return for yearly wellness visits  Rec annual mammogram

## 2019-10-17 LAB
CYTOLOGIST CVX/VAG CYTO: NORMAL
CYTOLOGY CVX/VAG DOC CYTO: NORMAL
CYTOLOGY CVX/VAG DOC THIN PREP: NORMAL
DX ICD CODE: NORMAL
LABCORP, 190119: NORMAL
Lab: NORMAL
OTHER STN SPEC: NORMAL
STAT OF ADQ CVX/VAG CYTO-IMP: NORMAL

## 2020-09-24 RX ORDER — CONJUGATED ESTROGENS AND MEDROXYPROGESTERONE ACETATE .3; 1.5 MG/1; MG/1
TABLET, SUGAR COATED ORAL
Qty: 30 TAB | Refills: 0 | Status: SHIPPED | OUTPATIENT
Start: 2020-09-24 | End: 2020-10-16 | Stop reason: SDUPTHER

## 2020-09-24 NOTE — TELEPHONE ENCOUNTER
Patient of     Request for Prempro 0.3-1.5 mg tab #84 with 4 rf    Last AE  10/15/19    Next AE  10/16/20

## 2020-10-16 ENCOUNTER — OFFICE VISIT (OUTPATIENT)
Dept: OBGYN CLINIC | Age: 60
End: 2020-10-16
Payer: COMMERCIAL

## 2020-10-16 VITALS — HEIGHT: 65 IN | WEIGHT: 132 LBS | BODY MASS INDEX: 21.99 KG/M2

## 2020-10-16 DIAGNOSIS — Z11.51 SPECIAL SCREENING EXAMINATION FOR HUMAN PAPILLOMAVIRUS (HPV): Primary | ICD-10-CM

## 2020-10-16 PROCEDURE — 99396 PREV VISIT EST AGE 40-64: CPT | Performed by: OBSTETRICS & GYNECOLOGY

## 2020-10-16 RX ORDER — CONJUGATED ESTROGENS AND MEDROXYPROGESTERONE ACETATE .3; 1.5 MG/1; MG/1
1 TABLET, SUGAR COATED ORAL DAILY
Qty: 90 TAB | Refills: 4 | Status: SHIPPED | OUTPATIENT
Start: 2020-10-16 | End: 2021-10-19 | Stop reason: SDUPTHER

## 2020-10-16 NOTE — PATIENT INSTRUCTIONS
Pelvic Exam: Care Instructions  Your Care Instructions     When your doctor examines all of your pelvic organs, it's called a pelvic exam. Two good reasons to have this kind of exam are to check for sexually transmitted infections (STIs) and to get a Pap test. A Pap test is also called a Pap smear. It checks for early changes that can lead to cancer of the cervix. Sometimes a pelvic exam is part of a regular checkup. Your doctor may ask you to avoid vaginal sex, tampons, vaginal medicines, vaginal sprays or powders, and douching for 1 to 2 days before the test.  Other times, women have this kind of exam at any time of the month. This is because they have pelvic pain, bleeding, or discharge. Or they may have another pelvic problem. Before your exam, it's important to share some information with your doctor. For example, if you are a survivor of rape or sexual abuse, you can talk about any concerns you may have. Your doctor will also want to know if you are pregnant or use birth control. And he or she will want to hear about any problems, surgeries, or procedures you have had in your pelvic area. You will also need to tell your doctor when your last period was. Follow-up care is a key part of your treatment and safety. Be sure to make and go to all appointments, and call your doctor if you are having problems. It's also a good idea to know your test results and keep a list of the medicines you take. How is a pelvic exam done? · During a pelvic exam, you will:  ? Take off your clothes below the waist. You will get a paper or cloth cover to put over the lower half of your body. If this is regular checkup, you may undress completely and put on a gown. ? Lie on your back on an exam table. Your feet will be raised above you. Stirrups will support your feet. · The doctor will:  ? Ask you to relax your knees. Your knees need to lean out, toward the walls. ?  Check the opening of your vagina for sores or swelling. ? Gently put a tool called a speculum into your vagina. It opens the vagina a little bit. You will feel some pressure. But if you are relaxed, it will not hurt. It lets your doctor see inside the vagina. ? Use a small brush, spatula, or swab to get a sample of cells, if you are having a Pap test or culture. The doctor then removes the speculum. ? Put on gloves and put one or two fingers of one hand into your vagina. The other hand goes on your lower belly. This lets your doctor feel your pelvic organs. You will probably feel some pressure. Try to stay relaxed. ? Put one gloved finger into your rectum and one into your vagina, if needed. This can also help check your pelvic organs. This exam takes about 10 minutes. At the end, you will get a washcloth or tissue to clean your vaginal area. You can then get dressed. Why is a pelvic exam done? A pelvic exam may be done:  · As part of a woman's regular physical checkup. The exam may include a Pap test.  · To check for vaginal infection. · To check for sexually transmitted infections, such as chlamydia or herpes. · To help find the cause of abnormal uterine bleeding. · To look for problems like uterine fibroids, ovarian cysts, or uterine prolapse. · To find the cause of pelvic or belly pain. · Before inserting an intrauterine device (IUD) for birth control. · To collect evidence if you've been sexually assaulted. What are the risks of a pelvic exam?  There is a small chance that the doctor will find something on a pelvic exam that would not have caused a problem. This is called overdiagnosis. It could lead to tests or treatment you don't need. When should you call for help? Watch closely for changes in your health, and be sure to contact your doctor if you have any problems. Where can you learn more? Go to http://www.gray.com/  Enter M421 in the search box to learn more about \"Pelvic Exam: Care Instructions. \"  Current as of: November 8, 2019               Content Version: 12.6  © 6469-2505 [employer]. Care instructions adapted under license by [facility] (which disclaims liability or warranty for this information). If you have questions about a medical condition or this instruction, always ask your healthcare professional. [address] any warranty or liability for your use of this information.

## 2020-10-16 NOTE — PROGRESS NOTES
Annual exam ages 40-58    Renea Cloud is a No obstetric history on file. ,  61 y.o. female WHITE OR  No LMP recorded. Patient is postmenopausal..    She presents for her annual checkup. She is having no significant problems. Menstrual status:    She denies dysmenorrhea. She reports no premenstrual symptoms. Sexual history:    She  reports being sexually active and has had partner(s) who are Male. She reports using the following method of birth control/protection: None. Medical conditions:    Since her last annual GYN exam about one year ago, she has not the following changes in her health history: none. Pap and Mammogram History:    Her most recent Pap smear was normal, obtained 1 year(s) ago. The patient has not had a recent mammogram.  Following up w Dr Harlan Greenwood to determine appropriate screening    Breast Cancer History/Substance Abuse: negative    Osteoporosis History:    Family history does not include a first or second degree relative with osteopenia or osteoporosis. Colonoscopy - 9/2020 removed 3 polyps      Past Medical History:   Diagnosis Date    ADHD (attention deficit hyperactivity disorder)     Delivery normal     GERD (gastroesophageal reflux disease)     Trigeminal neuralgia of right side of face 2005    gamma knife; current flare up    Vertigo      Past Surgical History:   Procedure Laterality Date    HX BREAST AUGMENTATION      HX OTHER SURGICAL      tooth implant    HX OTHER SURGICAL      cerclage    HX OTHER SURGICAL      knee surgery    HX WISDOM TEETH EXTRACTION         Current Outpatient Medications   Medication Sig Dispense Refill    Prempro 0.3-1.5 mg tab TAKE 1 TABLET BY MOUTH EVERY DAY 30 Tab 0    Dexlansoprazole (DEXILANT) 60 mg CpDB Take  by mouth.  methylphenidate (RITALIN) 10 mg tablet Take 10 mg by mouth three (3) times daily.       ondansetron (ZOFRAN ODT) 4 mg disintegrating tablet Take 1 Tab by mouth every eight (8) hours as needed for Nausea. 10 Tab 0     Allergies: Patient has no known allergies. Tobacco History:  reports that she has never smoked. She has never used smokeless tobacco.  Alcohol Abuse:  reports current alcohol use. Drug Abuse:  reports no history of drug use.     Family Medical/Cancer History:   Family History   Problem Relation Age of Onset    No Known Problems Mother     No Known Problems Father         Review of Systems - History obtained from the patient  Constitutional: negative for weight loss, fever, night sweats  HEENT: negative for hearing loss, earache, congestion, snoring, sorethroat  CV: negative for chest pain, palpitations, edema  Resp: negative for cough, shortness of breath, wheezing  GI: negative for change in bowel habits, abdominal pain, black or bloody stools  : negative for frequency, dysuria, hematuria, vaginal discharge  MSK: negative for back pain, joint pain, muscle pain  Breast: negative for breast lumps, nipple discharge, galactorrhea  Skin :negative for itching, rash, hives  Neuro: negative for dizziness, headache, confusion, weakness  Psych: negative for anxiety, depression, change in mood  Heme/lymph: negative for bleeding, bruising, pallor    Physical Exam    Visit Vitals  Ht 5' 4.5\" (1.638 m)   Wt 132 lb (59.9 kg)   BMI 22.31 kg/m²       Constitutional  · Appearance: well-nourished, well developed, alert, in no acute distress    HENT  · Head and Face: appears normal    Chest  · Respiratory Effort: breathing unlabored      Breasts  · Inspection of Breasts: breasts asymmetrical, no skin changes, no discharge present, nipple appearance normal, no skin retraction present  Bilateral implants  · Palpation of Breasts and Axillae: significant scarring; no masses present on palpation, no breast tenderness  · Axillary Lymph Nodes: no lymphadenopathy present    Gastrointestinal  · Abdominal Examination: abdomen non-tender to palpation, normal bowel sounds, no masses present  · Liver and spleen: no hepatomegaly present, spleen not palpable  · Hernias: no hernias identified    Skin  · General Inspection: no rash, no lesions identified    Neurologic/Psychiatric  · Mental Status:  · Orientation: grossly oriented to person, place and time  · Mood and Affect: mood normal, affect appropriate    Genitourinary  · External Genitalia: normal appearance for age, no discharge present, no tenderness present, no inflammatory lesions present, no masses present, no atrophy present  · Vagina: normal vaginal vault without central or paravaginal defects, no discharge present, no inflammatory lesions present, no masses present  · Bladder: non-tender to palpation  · Urethra: appears normal  · Cervix: normal   · Uterus: normal size, shape and consistency  · Adnexa: no adnexal tenderness present, no adnexal masses present  · Perineum: perineum within normal limits, no evidence of trauma, no rashes or skin lesions present  · Anus: anus within normal limits, no hemorrhoids present  · Inguinal Lymph Nodes: no lymphadenopathy present    Assessment:  Routine gynecologic examination  Her current medical status is satisfactory with no evidence of significant gynecologic issues. Plan:  Pap done today  Patient offered and completed Gradeable genetic screening questionnaire and  no changes in personal and family pertinent medical history. Patient declines presence of chaperone during today's visit.    Counseled re: diet, exercise, healthy lifestyle  Return for yearly wellness visits  Rec annual mammogram

## 2021-04-29 ENCOUNTER — DOCUMENTATION ONLY (OUTPATIENT)
Dept: OBGYN CLINIC | Age: 61
End: 2021-04-29

## 2021-04-29 DIAGNOSIS — Z98.82 S/P BILATERAL BREAST IMPLANTS: Primary | ICD-10-CM

## 2021-04-29 NOTE — PROGRESS NOTES
Received fax requesting order for Bilateral Breast MRI with and without contrast: further evaluation of isha breast implants, for upcoming scheduled scan on 5/5/21. Order placed, faxed via [employer] on 4/29/21 at 10:00.

## 2021-09-24 ENCOUNTER — TRANSCRIBE ORDER (OUTPATIENT)
Dept: SCHEDULING | Age: 61
End: 2021-09-24

## 2021-09-24 DIAGNOSIS — K21.9 GERD (GASTROESOPHAGEAL REFLUX DISEASE): ICD-10-CM

## 2021-09-24 DIAGNOSIS — J32.9 SINUSITIS: ICD-10-CM

## 2021-09-24 DIAGNOSIS — R05.9 COUGH IN ADULT: Primary | ICD-10-CM

## 2021-10-19 ENCOUNTER — OFFICE VISIT (OUTPATIENT)
Dept: OBGYN CLINIC | Age: 61
End: 2021-10-19
Payer: COMMERCIAL

## 2021-10-19 VITALS — BODY MASS INDEX: 22.14 KG/M2 | WEIGHT: 131 LBS | SYSTOLIC BLOOD PRESSURE: 128 MMHG | DIASTOLIC BLOOD PRESSURE: 82 MMHG

## 2021-10-19 DIAGNOSIS — Z01.419 WELL WOMAN EXAM WITH ROUTINE GYNECOLOGICAL EXAM: Primary | ICD-10-CM

## 2021-10-19 PROCEDURE — 99396 PREV VISIT EST AGE 40-64: CPT | Performed by: OBSTETRICS & GYNECOLOGY

## 2021-10-19 RX ORDER — CONJUGATED ESTROGENS AND MEDROXYPROGESTERONE ACETATE .3; 1.5 MG/1; MG/1
1 TABLET, SUGAR COATED ORAL DAILY
Qty: 90 TABLET | Refills: 4 | Status: SHIPPED | OUTPATIENT
Start: 2021-10-19

## 2021-10-19 NOTE — PROGRESS NOTES
Annual exam ages 40-58    Stephanie Márquez is a L2,  61 y.o. female WHITE/NON- No LMP recorded. Patient is postmenopausal..    She presents for her annual checkup. She has no specific concerns at this time. She is getting her breast implants replaced in the spring; they are 43years old and states they are encapsulated and ruptured. Last mammogram was last spring, which was hard to visualize due to the encapsulation and ruptures. Last colonoscopy was in 2020, being followed by a GI. Colonoscopies every 3 years per GI. Happy on low dose ERT; desires continued    Contraception:    The current method of family planning is post menopausal status. Sexual history:    She  reports being sexually active and has had partner(s) who are Male. She reports using the following method of birth control/protection: None. Medical conditions:    Since her last annual GYN exam about one year ago, she has not the following changes in her health history: none. Pap and Mammogram History:    Her most recent Pap smear was normal, obtained 1 year(s) ago. The patient had a mammogram in 2021. Needed f/u views with ultrasound and MRI. Breast Cancer History/Substance Abuse: negative    Osteoporosis History:    Family history does not include a first or second degree relative with osteopenia or osteoporosis.       Past Medical History:   Diagnosis Date    ADHD (attention deficit hyperactivity disorder)     Delivery normal     GERD (gastroesophageal reflux disease)     Trigeminal neuralgia of right side of face     gamma knife; current flare up    Vertigo      Past Surgical History:   Procedure Laterality Date    HX BREAST AUGMENTATION      HX OTHER SURGICAL      tooth implant    HX OTHER SURGICAL      cerclage    HX OTHER SURGICAL      knee surgery    HX WISDOM TEETH EXTRACTION         Current Outpatient Medications   Medication Sig Dispense Refill    estrogen, conjugated,-medroxyPROGESTERone (Prempro) 0.3-1.5 mg tab Take 1 Tab by mouth daily. 90 Tab 4    Dexlansoprazole (DEXILANT) 60 mg CpDB Take  by mouth.  methylphenidate (RITALIN) 10 mg tablet Take 10 mg by mouth three (3) times daily. Allergies: Patient has no known allergies. Tobacco History:  reports that she has never smoked. She has never used smokeless tobacco.  Alcohol Abuse:  reports current alcohol use. Drug Abuse:  reports no history of drug use.     Family Medical/Cancer History:   Family History   Problem Relation Age of Onset    No Known Problems Mother     No Known Problems Father         Review of Systems - History obtained from the patient  Constitutional: negative for weight loss, fever, night sweats  HEENT: negative for hearing loss, earache, congestion, snoring, sorethroat  CV: negative for chest pain, palpitations, edema  Resp: negative for cough, shortness of breath, wheezing  GI: negative for change in bowel habits, abdominal pain, black or bloody stools  : negative for frequency, dysuria, hematuria, vaginal discharge  MSK: negative for back pain, joint pain, muscle pain  Breast: negative for breast lumps, nipple discharge, galactorrhea  Skin :negative for itching, rash, hives  Neuro: negative for dizziness, headache, confusion, weakness  Psych: negative for anxiety, depression, change in mood  Heme/lymph: negative for bleeding, bruising, pallor    Physical Exam    Visit Vitals  Wt 131 lb (59.4 kg)   BMI 22.14 kg/m²       Constitutional  · Appearance: well-nourished, well developed, alert, in no acute distress    HENT  · Head and Face: appears normal    Chest  · Respiratory Effort: breathing unlabored      Breasts  · Inspection of Breasts: breasts symmetrical, no skin changes, no discharge present, nipple appearance normal, no skin retraction present  · Palpation of Breasts and Axillae: no masses present on palpation, no breast tenderness  · Axillary Lymph Nodes: no lymphadenopathy present    Gastrointestinal  · Abdominal Examination: abdomen non-tender to palpation, normal bowel sounds, no masses present  · Liver and spleen: no hepatomegaly present, spleen not palpable  · Hernias: no hernias identified    Skin  · General Inspection: no rash, no lesions identified    Neurologic/Psychiatric  · Mental Status:  · Orientation: grossly oriented to person, place and time  · Mood and Affect: mood normal, affect appropriate    Genitourinary  · External Genitalia: normal appearance for age, no discharge present, no tenderness present, no inflammatory lesions present, no masses present, no atrophy present  · Vagina: normal vaginal vault without central or paravaginal defects, no discharge present, no inflammatory lesions present, no masses present  · Bladder: non-tender to palpation  · Urethra: appears normal  · Cervix: normal   · Uterus: normal size, shape and consistency  · Adnexa: no adnexal tenderness present, no adnexal masses present  · Perineum: perineum within normal limits, no evidence of trauma, no rashes or skin lesions present  · Anus: anus within normal limits, no hemorrhoids present  · Inguinal Lymph Nodes: no lymphadenopathy present    Assessment:  Routine gynecologic examination  Her current medical status is satisfactory with no evidence of significant gynecologic issues. Plan:  Pap done today   script ERT  Patient declines presence of chaperone during today's visit.    Counseled re: diet, exercise, healthy lifestyle  Return for yearly wellness visits  Rec annual mammogram

## 2021-10-19 NOTE — PATIENT INSTRUCTIONS

## 2021-10-24 LAB
CYTOLOGIST CVX/VAG CYTO: NORMAL
CYTOLOGY CVX/VAG DOC CYTO: NORMAL
CYTOLOGY CVX/VAG DOC THIN PREP: NORMAL
DX ICD CODE: NORMAL
HPV I/H RISK 4 DNA CVX QL PROBE+SIG AMP: NEGATIVE
Lab: NORMAL
OTHER STN SPEC: NORMAL
STAT OF ADQ CVX/VAG CYTO-IMP: NORMAL

## 2022-03-07 ENCOUNTER — TRANSCRIBE ORDER (OUTPATIENT)
Dept: SCHEDULING | Age: 62
End: 2022-03-07

## 2022-03-07 DIAGNOSIS — J30.9 AR (ALLERGIC RHINITIS): ICD-10-CM

## 2022-03-07 DIAGNOSIS — Z00.8 OTHER SPECIFIED GENERAL MEDICAL EXAMINATION: ICD-10-CM

## 2022-03-07 DIAGNOSIS — R05.9 COUGH IN ADULT: Primary | ICD-10-CM

## 2022-03-07 DIAGNOSIS — J32.9 SINUSITIS: ICD-10-CM

## 2022-03-07 DIAGNOSIS — Z78.9 NON-SMOKER: ICD-10-CM

## 2022-03-11 ENCOUNTER — HOSPITAL ENCOUNTER (OUTPATIENT)
Dept: CT IMAGING | Age: 62
Discharge: HOME | End: 2022-03-11
Attending: OTOLARYNGOLOGY
Payer: COMMERCIAL

## 2022-03-11 DIAGNOSIS — Z00.8 OTHER SPECIFIED GENERAL MEDICAL EXAMINATION: ICD-10-CM

## 2022-03-11 DIAGNOSIS — R05.9 COUGH IN ADULT: ICD-10-CM

## 2022-03-11 DIAGNOSIS — J32.9 SINUSITIS: ICD-10-CM

## 2022-03-11 DIAGNOSIS — J30.9 AR (ALLERGIC RHINITIS): ICD-10-CM

## 2022-03-11 DIAGNOSIS — Z78.9 NON-SMOKER: ICD-10-CM

## 2022-03-11 PROCEDURE — 70486 CT MAXILLOFACIAL W/O DYE: CPT

## 2022-03-11 PROCEDURE — 74011000636 HC RX REV CODE- 636

## 2022-03-11 PROCEDURE — 71260 CT THORAX DX C+: CPT

## 2022-03-11 RX ADMIN — IOPAMIDOL 100 ML: 612 INJECTION, SOLUTION INTRAVENOUS at 12:12

## 2022-04-15 ENCOUNTER — OFFICE VISIT (OUTPATIENT)
Dept: SURGERY | Age: 62
End: 2022-04-15
Payer: COMMERCIAL

## 2022-04-15 ENCOUNTER — HOSPITAL ENCOUNTER (OUTPATIENT)
Dept: LAB | Age: 62
Discharge: HOME | End: 2022-04-15

## 2022-04-15 VITALS — BODY MASS INDEX: 22.2 KG/M2 | WEIGHT: 130 LBS | HEIGHT: 64 IN

## 2022-04-15 DIAGNOSIS — L98.8 SKIN LESION OF BREAST: Primary | ICD-10-CM

## 2022-04-15 PROCEDURE — 11104 PUNCH BX SKIN SINGLE LESION: CPT | Performed by: SURGERY

## 2022-04-15 PROCEDURE — 99203 OFFICE O/P NEW LOW 30 MIN: CPT | Performed by: SURGERY

## 2022-04-15 RX ORDER — DIPHENHYDRAMINE HYDROCHLORIDE 50 MG/ML
INJECTION, SOLUTION INTRAMUSCULAR; INTRAVENOUS ONCE
COMMUNITY

## 2022-04-15 RX ORDER — MONTELUKAST SODIUM 10 MG/1
10 TABLET ORAL DAILY
COMMUNITY

## 2022-04-15 NOTE — PROGRESS NOTES
HISTORY OF PRESENT ILLNESS  Burnice Crew is a 64 y.o. female. HPI NEW patient consult referred by  Dr. Gypsy Carlos for RIGHT nipple discharge. The area has been very sore and has a gold color to the discharge. This has been going on for a few weeks. Denies odor to the but the area has been tender to the touch and sore       Family History:  Paternal Grandmother- colon     [facility] 4/11/2022, BIRADS 2      Review of Systems   HENT: Positive for congestion. Respiratory: Positive for cough. Gastrointestinal: Positive for heartburn. Skin: Positive for itching and rash. Neurological: Positive for dizziness. All other systems reviewed and are negative.       Physical Exam    ASSESSMENT and PLAN  {ASSESSMENT/PLAN:95032}

## 2022-04-15 NOTE — PROGRESS NOTES
HISTORY OF PRESENT ILLNESS  Amanda Coulter is a 64 y.o. female. HPI  NEW patient consult referred by  Dr. White Grandchild for RIGHT nipple lesion. The area has been very sore and has a gold color to the discharge. This has been going on for a few weeks. Denies odor to the but the area has been tender to the touch and sore       Family History:  Paternal Grandmother- colon      MRI [facility] 4/11/2022, BIRADS 2     Past Medical History:   Diagnosis Date    ADHD (attention deficit hyperactivity disorder)     Delivery normal     GERD (gastroesophageal reflux disease)     Trigeminal neuralgia of right side of face 2005    gamma knife; current flare up    Vertigo        Past Surgical History:   Procedure Laterality Date    HX BREAST AUGMENTATION      HX OTHER SURGICAL      tooth implant    HX OTHER SURGICAL      cerclage    HX OTHER SURGICAL      knee surgery    HX WISDOM TEETH EXTRACTION         Social History     Socioeconomic History    Marital status:      Spouse name: Not on file    Number of children: Not on file    Years of education: Not on file    Highest education level: Not on file   Occupational History    Not on file   Tobacco Use    Smoking status: Never Smoker    Smokeless tobacco: Never Used   Vaping Use    Vaping Use: Never used   Substance and Sexual Activity    Alcohol use: Yes     Comment: socially    Drug use: No    Sexual activity: Yes     Partners: Male     Birth control/protection: None   Other Topics Concern    Not on file   Social History Narrative    Not on file     Social Determinants of Health     Financial Resource Strain:     Difficulty of Paying Living Expenses: Not on file   Food Insecurity:     Worried About Running Out of Food in the Last Year: Not on file    Brendan of Food in the Last Year: Not on file   Transportation Needs:     Lack of Transportation (Medical): Not on file    Lack of Transportation (Non-Medical):  Not on file   Physical Activity:  Days of Exercise per Week: Not on file    Minutes of Exercise per Session: Not on file   Stress:     Feeling of Stress : Not on file   Social Connections:     Frequency of Communication with Friends and Family: Not on file    Frequency of Social Gatherings with Friends and Family: Not on file    Attends [employer] Services: Not on file    Active Member of [address] [place] [address] Eventable or Organizations: Not on file    Attends Club or Organization Meetings: Not on file    Marital Status: Not on file   Intimate Partner Violence:     Fear of Current or Ex-Partner: Not on file    Emotionally Abused: Not on file    Physically Abused: Not on file    Sexually Abused: Not on file   Housing Stability:     Unable to Pay for Housing in the Last Year: Not on file    Number of [place] in the Last Year: Not on file    Unstable Housing in the Last Year: Not on file       Current Outpatient Medications on File Prior to Visit   Medication Sig Dispense Refill    loratadine 10 mg cap Take  by mouth.  montelukast (Singulair) 10 mg tablet Take 10 mg by mouth daily.  Dexlansoprazole (DEXILANT) 60 mg CpDB Take  by mouth.  methylphenidate (RITALIN) 10 mg tablet Take 10 mg by mouth three (3) times daily.  diphenhydrAMINE (BENADRYL) 50 mg/mL injection by IntraVENous route once.  estrogen, conjugated,-medroxyPROGESTERone (Prempro) 0.3-1.5 mg tab Take 1 Tablet by mouth daily. (Patient not taking: Reported on 4/15/2022) 90 Tablet 4     No current facility-administered medications on file prior to visit. No Known Allergies    OB History    No obstetric history on file. Obstetric Comments   Menarche 12, LMP unknown, # of children 1, age of 4st delivery 44, Hysterectomy/oophorectomy No/No, Breast bx No, history of breast feeding No, BCP Yes, Hormone therapy Yes             ROS  HENT: Positive for congestion. Respiratory: Positive for cough. Gastrointestinal: Positive for heartburn.    Skin: Positive for itching and rash. Neurological: Positive for dizziness. All other systems reviewed and are negative. Physical Exam  Exam conducted with a chaperone present. Cardiovascular:      Rate and Rhythm: Normal rate and regular rhythm. Heart sounds: Normal heart sounds. Pulmonary:      Breath sounds: Normal breath sounds. Chest:   Breasts: Breasts are symmetrical.      Right: Normal. No swelling, bleeding, inverted nipple, mass, nipple discharge, skin change, tenderness, axillary adenopathy or supraclavicular adenopathy. Left: Normal. No swelling, bleeding, inverted nipple, mass, nipple discharge, skin change, tenderness, axillary adenopathy or supraclavicular adenopathy. Lymphadenopathy:      Cervical:      Right cervical: No superficial, deep or posterior cervical adenopathy. Left cervical: No superficial, deep or posterior cervical adenopathy. Upper Body:      Right upper body: No supraclavicular or axillary adenopathy. Left upper body: No supraclavicular or axillary adenopathy. ASSESSMENT and PLAN    ICD-10-CM ICD-9-CM    1. Skin lesion of breast  L98.8 709.8 SURGICAL PATHOLOGY      SURGICAL PATHOLOGY     New patient presents for evaluation of RIGHT nipple lesion, and is doing well overall. Upon physical examination, noted abnormal right nipple and areola that is reddened and has a rash, fluid is oozing from rash. Discussed punch bx to rule out and pt agreed to proceed. One 3mm punch bx specimen obtained from nipple, pt tolerated this well. Will send bx specimen for PATH and f/u with the results. This plan was reviewed with the patient and patient agrees. All questions were answered. Total time spent was 40 minutes.     Written by Pablo Malloy, as dictated by Dr. Margarita Dow MD.

## 2022-04-15 NOTE — LETTER
4/18/2022 2:23 PM    Patient:  Burnice Crew   YOB: 1960  Date of Visit: 4/15/2022      Dear Dr. Emanuel Patricia: Thank you for referring Ms. Vinita Flores to me for evaluation/treatment. Below are the relevant portions of my assessment and plan of care. If you have questions, please do not hesitate to call me. I look forward to following Ms. Jean-Baptiste along with you.         Sincerely,      Sophia Gaviria MD

## 2022-04-20 ENCOUNTER — TELEPHONE (OUTPATIENT)
Dept: SURGERY | Age: 62
End: 2022-04-20

## 2022-11-10 RX ORDER — CONJUGATED ESTROGENS AND MEDROXYPROGESTERONE ACETATE .3; 1.5 MG/1; MG/1
TABLET, SUGAR COATED ORAL
Qty: 28 TABLET | Refills: 0 | Status: SHIPPED | OUTPATIENT
Start: 2022-11-10

## 2022-11-10 NOTE — TELEPHONE ENCOUNTER
64year old patient last seen in the office on 10/19/2021 and has next appointment on 12/7/2022      Prescription sent as per Md order to get patient to her scheduled appointment

## 2022-11-17 ENCOUNTER — TRANSCRIBE ORDER (OUTPATIENT)
Dept: SCHEDULING | Age: 62
End: 2022-11-17

## 2022-11-17 DIAGNOSIS — H93.A9 PULSATILE TINNITUS: Primary | ICD-10-CM

## 2022-11-18 ENCOUNTER — TRANSCRIBE ORDER (OUTPATIENT)
Dept: SCHEDULING | Age: 62
End: 2022-11-18

## 2022-11-21 ENCOUNTER — TRANSCRIBE ORDER (OUTPATIENT)
Dept: SCHEDULING | Age: 62
End: 2022-11-21

## 2022-11-21 DIAGNOSIS — H93.A9 TINNITUS OF VASCULAR ORIGIN: Primary | ICD-10-CM

## 2022-11-21 DIAGNOSIS — H93.A9 PULSATILE TINNITUS: ICD-10-CM

## 2022-11-23 ENCOUNTER — HOSPITAL ENCOUNTER (OUTPATIENT)
Dept: CT IMAGING | Age: 62
Discharge: HOME | End: 2022-11-23
Attending: OTOLARYNGOLOGY
Payer: COMMERCIAL

## 2022-11-23 DIAGNOSIS — H93.A9 PULSATILE TINNITUS: ICD-10-CM

## 2022-11-23 PROCEDURE — 70498 CT ANGIOGRAPHY NECK: CPT

## 2022-11-23 PROCEDURE — 74011000636 HC RX REV CODE- 636: Performed by: OTOLARYNGOLOGY

## 2022-11-23 RX ADMIN — IOPAMIDOL 100 ML: 755 INJECTION, SOLUTION INTRAVENOUS at 07:44

## 2022-12-06 ENCOUNTER — TRANSCRIBE ORDER (OUTPATIENT)
Dept: SCHEDULING | Age: 62
End: 2022-12-06

## 2022-12-06 DIAGNOSIS — Z78.9 NON-SMOKER: ICD-10-CM

## 2022-12-06 DIAGNOSIS — Z00.8 OTHER SPECIFIED GENERAL MEDICAL EXAMINATION: ICD-10-CM

## 2022-12-06 DIAGNOSIS — H93.A2 PULSATILE TINNITUS, LEFT EAR: Primary | ICD-10-CM

## 2022-12-07 ENCOUNTER — OFFICE VISIT (OUTPATIENT)
Dept: OBGYN CLINIC | Age: 62
End: 2022-12-07
Payer: COMMERCIAL

## 2022-12-07 VITALS — SYSTOLIC BLOOD PRESSURE: 130 MMHG | DIASTOLIC BLOOD PRESSURE: 84 MMHG | BODY MASS INDEX: 22.66 KG/M2 | WEIGHT: 132 LBS

## 2022-12-07 DIAGNOSIS — Z01.419 WELL WOMAN EXAM WITH ROUTINE GYNECOLOGICAL EXAM: Primary | ICD-10-CM

## 2022-12-07 PROCEDURE — 99396 PREV VISIT EST AGE 40-64: CPT | Performed by: OBSTETRICS & GYNECOLOGY

## 2022-12-07 RX ORDER — CONJUGATED ESTROGENS AND MEDROXYPROGESTERONE ACETATE .3; 1.5 MG/1; MG/1
1 TABLET, SUGAR COATED ORAL DAILY
Qty: 84 TABLET | Refills: 3 | Status: SHIPPED | OUTPATIENT
Start: 2022-12-07

## 2022-12-07 RX ORDER — CETIRIZINE HYDROCHLORIDE 10 MG/1
CAPSULE, LIQUID FILLED ORAL
COMMUNITY

## 2022-12-07 NOTE — PROGRESS NOTES
Annual exam ages postmenopausal    Marina Torres is a No obstetric history on file. ,  64 y.o. female WHITE/NON- No LMP recorded. Patient is postmenopausal..    She presents for her annual checkup. She is having no concerns. Using prempro. Since last visit, patient was scheduled to get breast implants replaced. Procedure did not happen, due to anesthesia concerns. In mean time, patient was referred to Dr. Fabian Man from plastic surgeon due to a concern of a R breast lesion. Punch biopsy done, benign path. New diagnosis of occular migraines and vertigo. Due for colonoscopy next year, last 09/2020. Menstrual status:    Now menopausal  She is Prempro using ERT    Sexual history:    She  reports being sexually active and has had partner(s) who are male. She reports using the following method of birth control/protection: None. Medical conditions:    Since her last annual GYN exam about one year ago, she has not the following changes in her health history: none. Pap and Mammogram History:    Her most recent Pap smear was normal, obtained 1 year(s) ago. The patient had breast MRI [facility] 4/11/2022, BIRADS 2. Breast Cancer History/Substance Abuse: negative    Osteoporosis History:    Family history does not include a first or second degree relative with osteopenia or osteoporosis.       Past Medical History:   Diagnosis Date    ADHD (attention deficit hyperactivity disorder)     Delivery normal     GERD (gastroesophageal reflux disease)     Ocular migraine     Trigeminal neuralgia of right side of face 2005    gamma knife; current flare up    Vertigo      Past Surgical History:   Procedure Laterality Date    HX BREAST AUGMENTATION      HX OTHER SURGICAL      tooth implant    HX OTHER SURGICAL      cerclage    HX OTHER SURGICAL      knee surgery    HX WISDOM TEETH EXTRACTION         Current Outpatient Medications   Medication Sig Dispense Refill    Cetirizine (ZyrTEC) 10 mg cap Take  by mouth.      estrogen, conjugated,-medroxyPROGESTERone (Prempro) 0.3-1.5 mg tab TAKE 1 TABLET BY MOUTH EVERY DAY 28 Tablet 0    methylphenidate HCl (RITALIN) 10 mg tablet Take 10 mg by mouth three (3) times daily. loratadine 10 mg cap Take  by mouth.      montelukast (Singulair) 10 mg tablet Take 10 mg by mouth daily. diphenhydrAMINE (BENADRYL) 50 mg/mL injection by IntraVENous route once. Dexlansoprazole (DEXILANT) 60 mg CpDB Take  by mouth. Allergies: Patient has no known allergies. Tobacco History:  reports that she has never smoked. She has never used smokeless tobacco.  Alcohol Abuse:  reports current alcohol use. Drug Abuse:  reports no history of drug use.     Family Medical/Cancer History:   Family History   Problem Relation Age of Onset    No Known Problems Mother     No Known Problems Father         Review of Systems - History obtained from the patient  Constitutional: negative for weight loss, fever, night sweats  HEENT: negative for hearing loss, earache, congestion, snoring, sorethroat  CV: negative for chest pain, palpitations, edema  Resp: negative for cough, shortness of breath, wheezing  GI: negative for change in bowel habits, abdominal pain, black or bloody stools  : negative for frequency, dysuria, hematuria, vaginal discharge  MSK: negative for back pain, joint pain, muscle pain  Breast: negative for breast lumps, nipple discharge, galactorrhea  Skin :negative for itching, rash, hives  Neuro: negative for dizziness, headache, confusion, weakness  Psych: negative for anxiety, depression, change in mood  Heme/lymph: negative for bleeding, bruising, pallor    Physical Exam    Visit Vitals  /84   Wt 132 lb (59.9 kg)   BMI 22.66 kg/m²       Constitutional  Appearance: well-nourished, well developed, alert, in no acute distress    HENT  Head and Face: appears normal    Chest  Respiratory Effort: breathing unlabored      Breasts  Inspection of Breasts: bilateral implants  Palpation of Breasts and Axillae: bilateral scarring of implants  Axillary Lymph Nodes: no lymphadenopathy present    Gastrointestinal  Abdominal Examination: abdomen non-tender to palpation, normal bowel sounds, no masses present  Liver and spleen: no hepatomegaly present, spleen not palpable  Hernias: no hernias identified    Skin  General Inspection: no rash, no lesions identified    Neurologic/Psychiatric  Mental Status:  Orientation: grossly oriented to person, place and time  Mood and Affect: mood normal, affect appropriate    Genitourinary  External Genitalia: normal appearance for age, no discharge present, no tenderness present, no inflammatory lesions present, no masses present, natrophy present  Vagina: normal vaginal vault without central or paravaginal defects, no discharge present, no inflammatory lesions present, no masses present  Bladder: non-tender to palpation  Urethra: appears normal  Cervix: normal   Uterus: normal size, shape and consistency  Adnexa: no adnexal tenderness present, no adnexal masses present  Perineum: perineum within normal limits, no evidence of trauma, no rashes or skin lesions present  Anus: anus within normal limits, no hemorrhoids present  Inguinal Lymph Nodes: no lymphadenopathy present    Assessment:  Routine gynecologic examination  Postmenopausal on ERT  Her current medical status is satisfactory with no evidence of significant gynecologic issues. Plan:  Pap done today   Script ERT  Patient declines presence of chaperone during today's visit.    Counseled re: diet, exercise, healthy lifestyle  Return for yearly wellness visits  Rec annual mammogram

## 2022-12-12 ENCOUNTER — TRANSCRIBE ORDER (OUTPATIENT)
Dept: SCHEDULING | Age: 62
End: 2022-12-12

## 2022-12-12 DIAGNOSIS — H93.A2 PULSATILE TINNITUS, LEFT EAR: Primary | ICD-10-CM

## 2022-12-13 ENCOUNTER — TRANSCRIBE ORDER (OUTPATIENT)
Dept: SCHEDULING | Age: 62
End: 2022-12-13

## 2022-12-15 ENCOUNTER — HOSPITAL ENCOUNTER (OUTPATIENT)
Dept: MRI IMAGING | Age: 62
End: 2022-12-15
Attending: OTOLARYNGOLOGY
Payer: COMMERCIAL

## 2022-12-15 VITALS — BODY MASS INDEX: 22.31 KG/M2 | WEIGHT: 130 LBS

## 2022-12-15 DIAGNOSIS — H93.A2 PULSATILE TINNITUS, LEFT EAR: ICD-10-CM

## 2022-12-15 PROCEDURE — 70553 MRI BRAIN STEM W/O & W/DYE: CPT

## 2022-12-15 PROCEDURE — 74011250636 HC RX REV CODE- 250/636: Performed by: RADIOLOGY

## 2022-12-15 PROCEDURE — A9576 INJ PROHANCE MULTIPACK: HCPCS | Performed by: RADIOLOGY

## 2022-12-15 RX ADMIN — GADOTERIDOL 12 ML: 279.3 INJECTION, SOLUTION INTRAVENOUS at 10:17

## 2022-12-19 ENCOUNTER — TRANSCRIBE ORDER (OUTPATIENT)
Dept: SCHEDULING | Age: 62
End: 2022-12-19

## 2022-12-19 DIAGNOSIS — H93.A2 PULSATILE TINNITUS OF LEFT EAR: Primary | ICD-10-CM

## 2023-01-18 ENCOUNTER — OFFICE VISIT (OUTPATIENT)
Dept: NEUROSURGERY | Age: 63
End: 2023-01-18
Payer: COMMERCIAL

## 2023-01-18 VITALS
HEIGHT: 64 IN | TEMPERATURE: 96.3 F | SYSTOLIC BLOOD PRESSURE: 112 MMHG | HEART RATE: 92 BPM | WEIGHT: 130 LBS | OXYGEN SATURATION: 98 % | DIASTOLIC BLOOD PRESSURE: 70 MMHG | BODY MASS INDEX: 22.2 KG/M2

## 2023-01-18 DIAGNOSIS — H93.A3 PULSATILE TINNITUS, BILATERAL: Primary | ICD-10-CM

## 2023-01-18 PROCEDURE — 99204 OFFICE O/P NEW MOD 45 MIN: CPT | Performed by: RADIOLOGY

## 2023-01-18 RX ORDER — CETIRIZINE HYDROCHLORIDE 10 MG/1
10 CAPSULE, LIQUID FILLED ORAL
COMMUNITY

## 2023-01-18 RX ORDER — ACETAMINOPHEN 500 MG
1000 TABLET ORAL DAILY
COMMUNITY

## 2023-01-18 RX ORDER — IBUPROFEN 200 MG
400 TABLET ORAL DAILY
COMMUNITY

## 2023-01-18 RX ORDER — LISINOPRIL 10 MG/1
10 TABLET ORAL DAILY
COMMUNITY
Start: 2023-01-01

## 2023-01-18 RX ORDER — DIPHENHYDRAMINE HCL 25 MG
25 TABLET ORAL
COMMUNITY

## 2023-01-18 RX ORDER — ASPIRIN 81 MG/1
81 TABLET ORAL DAILY
COMMUNITY

## 2023-01-18 NOTE — LETTER
1/28/2023    Patient: Chantell Reynoso   YOB: 1960   Date of Visit: 1/18/2023     Karie Burton MD  [address] [address]  Via Fax: [phone]     Noah Lopez MD  [address] [address]  [address] [address]  Via In [place]    Dear MD Noah Rooney MD,      Thank you for referring Ms. Martine Andrews to [address] [place] [address] for evaluation. My notes for this consultation are attached. If you have questions, please do not hesitate to call me. I look forward to following your patient along with you.       Sincerely,    Sadia Rosales MD

## 2023-01-19 NOTE — PROGRESS NOTES
New patient referred by Dr Emre Zaldivar presenting with Pulsatile Tinnitus. Patient reports constant swooshing in left ear and thumping sound in the right ear. Reports frequent headaches and vertigo. No acute problems reported.

## 2023-01-28 NOTE — PROGRESS NOTES
Neurointerventional Surgery Clinic Note        Patient: Sarah Saavedra MRN: [id number]  SSN: [id number]    YOB: 1960  Age: 58 y.o. Sex: female      Subjective:      Sarah Saavedra is a 58 y.o. female who is being seen for pulsatile tinnitus, more conspicuous on the left. Additionally, patient feels twinge of pain when sneezing/coughing on the left. She presents to the clinic with a prior CT of the head and neck. Past Medical History:   Diagnosis Date    ADHD (attention deficit hyperactivity disorder)     Asthma     Delivery normal     Eczema     Essential hypertension     GERD (gastroesophageal reflux disease)     Hearing reduced     Migraine     Ocular migraine     Trigeminal neuralgia of right side of face 2005    gamma knife; current flare up    Vertigo      Past Surgical History:   Procedure Laterality Date    HX BREAST AUGMENTATION      HX OTHER SURGICAL      tooth implant    HX OTHER SURGICAL      cerclage    HX OTHER SURGICAL      knee surgery    HX WISDOM TEETH EXTRACTION        Family History   Problem Relation Age of Onset    No Known Problems Mother     No Known Problems Father      Social History     Tobacco Use    Smoking status: Never    Smokeless tobacco: Never   Substance Use Topics    Alcohol use: Yes     Alcohol/week: 4.0 standard drinks     Types: 4 Glasses of wine per week     Comment: socially      Current Outpatient Medications   Medication Sig Dispense Refill    lisinopriL (PRINIVIL, ZESTRIL) 10 mg tablet Take 10 mg by mouth daily. Cetirizine (ZyrTEC) 10 mg cap Take 10 mg by mouth nightly. diphenhydrAMINE (BENADRYL) 25 mg tablet Take 25 mg by mouth nightly. aspirin delayed-release 81 mg tablet Take 81 mg by mouth daily. acetaminophen (Tylenol Extra Strength) 500 mg tablet Take 1,000 mg by mouth daily. ibuprofen (AdviL) 200 mg tablet Take 400 mg by mouth daily.       estrogen, conjugated,-medroxyPROGESTERone (Prempro) 0.3-1.5 mg tab Take 1 Tablet by mouth daily. 84 Tablet 3    methylphenidate HCl (RITALIN) 10 mg tablet Take 10 mg by mouth three (3) times daily. Allergies   Allergen Reactions    Other Medication Nausea and Vomiting     Intolerance to Narcotics       Review of Systems:  Pertinent items are noted in the History of Present Illness. Objective:     Vitals:    01/18/23 1533   BP: 112/70   Pulse: 92   Temp: (!) 96.3 °F (35.7 °C)   TempSrc: Temporal   SpO2: 98%   Weight: 130 lb (59 kg)   Height: 5' 4\" (1.626 m)        Physical Exam:  GENERAL: alert, cooperative, no distress, appears stated age  LUNG: clear to auscultation bilaterally  HEART: regular rate and rhythm  EXTREMITIES:  extremities normal, atraumatic, no cyanosis or edema    Neurologic Exam:  Mental Status:  Alert and oriented x 4. Appropriate affect, mood and behavior. Language:    Normal fluency, repetition, comprehension and naming. Cranial Nerves:   EOMI, PERRLA      Facial sensation intact V1 - V3. Full facial strength, no asymmetry. Hearing intact bilaterally. No dysarthria. Tongue protrudes to midline  symmetrically. Shoulder shrug 5/5 bilaterally. Motor:    No pronator drift. Bulk and tone normal.      5/5 power in all extremities proximally and distally. No involuntary movements. Sensation:    Sensation intact throughout to light touch    Reflexes:    Deferred    Coordination & Gait: Normal      My interpretation of Imaging:       I have reviewed patient's prior imaging including CTA of the head and neck performed on 11/23/2022. No definite intracranial arterial or venous stenosis is identified. 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 Similar finding although causing moderate stenosis is noted on the right side. See the annotated images below.               As the dural venous sinuses are suboptimally assessed on the CTA, I have ordered MR venogram of the brain for further assessment. 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 Similar findings, although less conspicuous is noted involving the right internal jugular vein. I have reviewed the findings with the patient and answered all her relevant questions. In addition, I have ordered MR venogram of the head for detailed assessment of her dural venous sinuses. Plan:     Patient recommendation for reassessment by ENT. MR venogram of the head to assess status of intracranial dural venous sinuses. Thank you for allowing me to participate in the care of this patient. Greater than 45 minutes were spent in patient management, greater than half of which was spent in counseling and coordination of care.         Signed By: Moiz Shah MD, MBA  Neuro-endovascular Surgery  Director - [facility], [facility]  Associate Professor of Radiology, [facility]      January 28, 2023

## 2023-02-20 ENCOUNTER — HOSPITAL ENCOUNTER (OUTPATIENT)
Dept: MRI IMAGING | Age: 63
Discharge: HOME | End: 2023-02-20
Attending: RADIOLOGY
Payer: COMMERCIAL

## 2023-02-20 DIAGNOSIS — H93.A3 PULSATILE TINNITUS, BILATERAL: ICD-10-CM

## 2023-02-20 PROCEDURE — 70544 MR ANGIOGRAPHY HEAD W/O DYE: CPT

## 2023-04-20 ENCOUNTER — TRANSCRIBE ORDER (OUTPATIENT)
Dept: SCHEDULING | Age: 63
End: 2023-04-20

## 2023-04-20 DIAGNOSIS — R91.8 MULTIPLE PULMONARY NODULES: Primary | ICD-10-CM

## 2023-04-22 DIAGNOSIS — H93.A2 PULSATILE TINNITUS, LEFT EAR: Primary | ICD-10-CM

## 2023-04-22 DIAGNOSIS — Z78.9 NON-SMOKER: ICD-10-CM

## 2023-04-26 ENCOUNTER — TRANSCRIBE ORDERS (OUTPATIENT)
Facility: HOSPITAL | Age: 63
End: 2023-04-26

## 2023-04-26 DIAGNOSIS — R91.8 LUNG MASS: Primary | ICD-10-CM

## 2023-04-27 ENCOUNTER — HOSPITAL ENCOUNTER (OUTPATIENT)
Dept: CT IMAGING | Age: 63
End: 2023-04-27
Attending: INTERNAL MEDICINE

## 2023-06-06 ENCOUNTER — HOSPITAL ENCOUNTER (OUTPATIENT)
Facility: HOSPITAL | Age: 63
Discharge: HOME | End: 2023-06-09
Payer: COMMERCIAL

## 2023-06-06 DIAGNOSIS — R91.8 LUNG MASS: ICD-10-CM

## 2023-06-06 PROCEDURE — 71250 CT THORAX DX C-: CPT

## 2023-08-31 ENCOUNTER — TRANSCRIBE ORDERS (OUTPATIENT)
Facility: HOSPITAL | Age: 63
End: 2023-08-31

## 2023-08-31 DIAGNOSIS — Z82.49 FAMILY HISTORY OF ABDOMINAL AORTIC ANEURYSM (AAA): Primary | ICD-10-CM

## 2023-09-07 ENCOUNTER — HOSPITAL ENCOUNTER (OUTPATIENT)
Facility: HOSPITAL | Age: 63
Discharge: HOME | End: 2023-09-07
Payer: COMMERCIAL

## 2023-09-07 DIAGNOSIS — Z82.49 FAMILY HISTORY OF ABDOMINAL AORTIC ANEURYSM (AAA): ICD-10-CM

## 2023-09-07 DIAGNOSIS — Z82.49 FAMILY HISTORY OF ABDOMINAL AORTIC ANEURYSM: ICD-10-CM

## 2023-09-07 LAB
VAS AORTA DIST AP: 1.5 CM
VAS AORTA DIST TR: 1.6 CM
VAS AORTA MID AP: 1.6 CM
VAS AORTA MID TRANS: 2 CM
VAS AORTA PROX AP: 2.4 CM
VAS AORTA PROX TR: 2.5 CM
VAS LEFT COM ILIAC AP: 0.9 CM
VAS LEFT COM ILIAC TRANS: 1.2 CM
VAS RIGHT COM ILIAC AP: 0.9 CM
VAS RIGHT COM ILIAC TRANS: 1.2 CM

## 2023-09-07 PROCEDURE — 76706 US ABDL AORTA SCREEN AAA: CPT

## 2023-12-14 ENCOUNTER — TELEPHONE (OUTPATIENT)
Age: 63
End: 2023-12-14

## 2023-12-14 NOTE — TELEPHONE ENCOUNTER
Received a request from [facility] for medical records. Faxed ov note & surgical pathology results to [phone] and [phone].

## 2024-01-16 ENCOUNTER — OFFICE VISIT (OUTPATIENT)
Age: 64
End: 2024-01-16
Payer: COMMERCIAL

## 2024-01-16 VITALS — BODY MASS INDEX: 21.8 KG/M2 | WEIGHT: 127 LBS

## 2024-01-16 DIAGNOSIS — Z01.419 WELL WOMAN EXAM: Primary | ICD-10-CM

## 2024-01-16 PROCEDURE — 99396 PREV VISIT EST AGE 40-64: CPT | Performed by: OBSTETRICS & GYNECOLOGY

## 2024-01-16 RX ORDER — IRBESARTAN 75 MG/1
TABLET ORAL
COMMUNITY

## 2024-01-16 NOTE — PROGRESS NOTES
Annual exam    Isha Jaime is a 63 y.o. postmenopausal female presenting for annual exam.  Risks and benefits of ERT fully reviewed and all questions answered.   Wants to remain on ERT.  [place] and daughter in [place]  Having breast implants replaced in 4/2023    Ob/Gyn Hx:  G P -  PMB -    Menopausal symptoms -  none on prempro  HRT - prempro      Health maintenance:  Pap - Last PAP was normal; December/2022.  Mammo - Mammogram was normal, /2022.  Colonoscopy - Last colonoscopy was normal  Dexa- many years ago    Past Medical History:   Diagnosis Date    ADHD (attention deficit hyperactivity disorder)     Asthma     Delivery normal     Eczema     Essential hypertension     GERD (gastroesophageal reflux disease)     Hearing reduced     Migraine     Ocular migraine     Trigeminal neuralgia of right side of face 2005    gamma knife; current flare up    Vertigo        Past Surgical History:   Procedure Laterality Date    BREAST SURGERY      OTHER SURGICAL HISTORY      tooth implant    OTHER SURGICAL HISTORY      cerclage    OTHER SURGICAL HISTORY      knee surgery    WISDOM TOOTH EXTRACTION         Family History   Problem Relation Age of Onset    No Known Problems Mother     No Known Problems Father        Social History     Socioeconomic History    Marital status:      Spouse name: Not on file    Number of children: Not on file    Years of education: Not on file    Highest education level: Not on file   Occupational History    Not on file   Tobacco Use    Smoking status: Never    Smokeless tobacco: Never   Substance and Sexual Activity    Alcohol use: Yes     Alcohol/week: 4.0 standard drinks of alcohol    Drug use: No    Sexual activity: Not on file   Other Topics Concern    Not on file   Social History Narrative    Not on file     Social Determinants of Health     Financial Resource Strain: Not on file   Food Insecurity: Not on file   Transportation Needs: Not on file   Physical

## 2024-01-18 LAB
., LABCORP: NORMAL
CYTOLOGIST CVX/VAG CYTO: NORMAL
CYTOLOGY CVX/VAG DOC CYTO: NORMAL
CYTOLOGY CVX/VAG DOC THIN PREP: NORMAL
DX ICD CODE: NORMAL
Lab: NORMAL
OTHER STN SPEC: NORMAL
STAT OF ADQ CVX/VAG CYTO-IMP: NORMAL

## 2024-02-01 ENCOUNTER — HOSPITAL ENCOUNTER (EMERGENCY)
Facility: HOSPITAL | Age: 64
Discharge: HOME | End: 2024-02-01
Attending: STUDENT IN AN ORGANIZED HEALTH CARE EDUCATION/TRAINING PROGRAM
Payer: COMMERCIAL

## 2024-02-01 ENCOUNTER — APPOINTMENT (OUTPATIENT)
Facility: HOSPITAL | Age: 64
End: 2024-02-01
Attending: STUDENT IN AN ORGANIZED HEALTH CARE EDUCATION/TRAINING PROGRAM
Payer: COMMERCIAL

## 2024-02-01 VITALS
BODY MASS INDEX: 21.94 KG/M2 | TEMPERATURE: 98.2 F | RESPIRATION RATE: 16 BRPM | DIASTOLIC BLOOD PRESSURE: 82 MMHG | HEART RATE: 60 BPM | HEIGHT: 64 IN | SYSTOLIC BLOOD PRESSURE: 129 MMHG | WEIGHT: 128.53 LBS | OXYGEN SATURATION: 98 %

## 2024-02-01 DIAGNOSIS — R42 VERTIGO: Primary | ICD-10-CM

## 2024-02-01 LAB
ALBUMIN SERPL-MCNC: 3.6 G/DL (ref 3.5–5)
ALBUMIN/GLOB SERPL: 1.2 (ref 1.1–2.2)
ALP SERPL-CCNC: 50 U/L (ref 45–117)
ALT SERPL-CCNC: 18 U/L (ref 12–78)
ANION GAP SERPL CALC-SCNC: 8 MMOL/L (ref 5–15)
APPEARANCE UR: CLEAR
AST SERPL-CCNC: 19 U/L (ref 15–37)
BACTERIA URNS QL MICRO: ABNORMAL /HPF
BASOPHILS # BLD: 0.1 K/UL (ref 0–0.1)
BASOPHILS NFR BLD: 1 % (ref 0–1)
BILIRUB SERPL-MCNC: 0.5 MG/DL (ref 0.2–1)
BILIRUB UR QL: NEGATIVE
BUN SERPL-MCNC: 19 MG/DL (ref 6–20)
BUN/CREAT SERPL: 22 (ref 12–20)
CALCIUM SERPL-MCNC: 8.2 MG/DL (ref 8.5–10.1)
CHLORIDE SERPL-SCNC: 99 MMOL/L (ref 97–108)
CO2 SERPL-SCNC: 29 MMOL/L (ref 21–32)
COLOR UR: ABNORMAL
CREAT SERPL-MCNC: 0.86 MG/DL (ref 0.55–1.02)
DIFFERENTIAL METHOD BLD: NORMAL
EKG ATRIAL RATE: 61 BPM
EKG DIAGNOSIS: NORMAL
EKG P AXIS: 44 DEGREES
EKG P-R INTERVAL: 158 MS
EKG Q-T INTERVAL: 452 MS
EKG QRS DURATION: 80 MS
EKG QTC CALCULATION (BAZETT): 455 MS
EKG R AXIS: 32 DEGREES
EKG T AXIS: 64 DEGREES
EKG VENTRICULAR RATE: 61 BPM
EOSINOPHIL # BLD: 0.2 K/UL (ref 0–0.4)
EOSINOPHIL NFR BLD: 4 % (ref 0–7)
EPITH CASTS URNS QL MICRO: ABNORMAL /LPF
ERYTHROCYTE [DISTWIDTH] IN BLOOD BY AUTOMATED COUNT: 11.8 % (ref 11.5–14.5)
GLOBULIN SER CALC-MCNC: 3.1 G/DL (ref 2–4)
GLUCOSE SERPL-MCNC: 98 MG/DL (ref 65–100)
GLUCOSE UR STRIP.AUTO-MCNC: NEGATIVE MG/DL
HCT VFR BLD AUTO: 40.3 % (ref 35–47)
HGB BLD-MCNC: 13.4 G/DL (ref 11.5–16)
HGB UR QL STRIP: NEGATIVE
IMM GRANULOCYTES # BLD AUTO: 0 K/UL (ref 0–0.04)
IMM GRANULOCYTES NFR BLD AUTO: 0 % (ref 0–0.5)
KETONES UR QL STRIP.AUTO: ABNORMAL MG/DL
LEUKOCYTE ESTERASE UR QL STRIP.AUTO: NEGATIVE
LIPASE SERPL-CCNC: 61 U/L (ref 13–75)
LYMPHOCYTES # BLD: 1.6 K/UL (ref 0.8–3.5)
LYMPHOCYTES NFR BLD: 29 % (ref 12–49)
MAGNESIUM SERPL-MCNC: 2.1 MG/DL (ref 1.6–2.4)
MCH RBC QN AUTO: 29.3 PG (ref 26–34)
MCHC RBC AUTO-ENTMCNC: 33.3 G/DL (ref 30–36.5)
MCV RBC AUTO: 88 FL (ref 80–99)
MONOCYTES # BLD: 0.4 K/UL (ref 0–1)
MONOCYTES NFR BLD: 7 % (ref 5–13)
MUCOUS THREADS URNS QL MICRO: ABNORMAL /LPF
NEUTS SEG # BLD: 3.4 K/UL (ref 1.8–8)
NEUTS SEG NFR BLD: 59 % (ref 32–75)
NITRITE UR QL STRIP.AUTO: NEGATIVE
NRBC # BLD: 0 K/UL (ref 0–0.01)
NRBC BLD-RTO: 0 PER 100 WBC
PH UR STRIP: 6 (ref 5–8)
PLATELET # BLD AUTO: 300 K/UL (ref 150–400)
PMV BLD AUTO: 8.9 FL (ref 8.9–12.9)
POTASSIUM SERPL-SCNC: 3.7 MMOL/L (ref 3.5–5.1)
PROT SERPL-MCNC: 6.7 G/DL (ref 6.4–8.2)
PROT UR STRIP-MCNC: NEGATIVE MG/DL
RBC # BLD AUTO: 4.58 M/UL (ref 3.8–5.2)
RBC #/AREA URNS HPF: ABNORMAL /HPF (ref 0–5)
SODIUM SERPL-SCNC: 136 MMOL/L (ref 136–145)
SP GR UR REFRACTOMETRY: 1.02 (ref 1–1.03)
TROPONIN I SERPL HS-MCNC: <4 NG/L (ref 0–51)
UROBILINOGEN UR QL STRIP.AUTO: 0.2 EU/DL (ref 0.2–1)
WBC # BLD AUTO: 5.7 K/UL (ref 3.6–11)
WBC URNS QL MICRO: ABNORMAL /HPF (ref 0–4)

## 2024-02-01 PROCEDURE — 84484 ASSAY OF TROPONIN QUANT: CPT

## 2024-02-01 PROCEDURE — 6370000000 HC RX 637 (ALT 250 FOR IP): Performed by: STUDENT IN AN ORGANIZED HEALTH CARE EDUCATION/TRAINING PROGRAM

## 2024-02-01 PROCEDURE — 36415 COLL VENOUS BLD VENIPUNCTURE: CPT

## 2024-02-01 PROCEDURE — 85025 COMPLETE CBC W/AUTO DIFF WBC: CPT

## 2024-02-01 PROCEDURE — 71045 X-RAY EXAM CHEST 1 VIEW: CPT

## 2024-02-01 PROCEDURE — 83690 ASSAY OF LIPASE: CPT

## 2024-02-01 PROCEDURE — 83735 ASSAY OF MAGNESIUM: CPT

## 2024-02-01 PROCEDURE — 93005 ELECTROCARDIOGRAM TRACING: CPT | Performed by: STUDENT IN AN ORGANIZED HEALTH CARE EDUCATION/TRAINING PROGRAM

## 2024-02-01 PROCEDURE — 87086 URINE CULTURE/COLONY COUNT: CPT

## 2024-02-01 PROCEDURE — 80053 COMPREHEN METABOLIC PANEL: CPT

## 2024-02-01 PROCEDURE — 81001 URINALYSIS AUTO W/SCOPE: CPT

## 2024-02-01 PROCEDURE — 94761 N-INVAS EAR/PLS OXIMETRY MLT: CPT

## 2024-02-01 PROCEDURE — 99285 EMERGENCY DEPT VISIT HI MDM: CPT

## 2024-02-01 RX ORDER — ONDANSETRON 4 MG/1
4 TABLET, ORALLY DISINTEGRATING ORAL 3 TIMES DAILY PRN
Qty: 30 TABLET | Refills: 0 | Status: SHIPPED | OUTPATIENT
Start: 2024-02-01

## 2024-02-01 RX ORDER — MECLIZINE HCL 12.5 MG/1
25 TABLET ORAL ONCE
Status: COMPLETED | OUTPATIENT
Start: 2024-02-01 | End: 2024-02-01

## 2024-02-01 RX ORDER — ACETAMINOPHEN 500 MG
1000 TABLET ORAL ONCE
Status: COMPLETED | OUTPATIENT
Start: 2024-02-01 | End: 2024-02-01

## 2024-02-01 RX ORDER — MECLIZINE HYDROCHLORIDE 25 MG/1
25 TABLET ORAL 3 TIMES DAILY PRN
Qty: 30 TABLET | Refills: 0 | Status: SHIPPED | OUTPATIENT
Start: 2024-02-01

## 2024-02-01 RX ADMIN — MECLIZINE HYDROCHLORIDE 25 MG: 12.5 TABLET ORAL at 18:22

## 2024-02-01 RX ADMIN — ACETAMINOPHEN 1000 MG: 500 TABLET ORAL at 18:20

## 2024-02-01 ASSESSMENT — PAIN SCALES - GENERAL: PAINLEVEL_OUTOF10: 0

## 2024-02-01 NOTE — ED TRIAGE NOTES
Pt c/o dizziness and nausea since 3pm. Pt states that she checked her BP and it was much higher than her norm. PT has similar episode last week. Pt does have hx of vertigo. Pt is not sure if the dizziness is causing the HTN or vice versa.

## 2024-02-02 NOTE — DISCHARGE INSTRUCTIONS
A urine culture was sent. You will only be called if it is positive, and they will coordinate antibiotics for you if needed. If you do not get a call it is negative.

## 2024-02-03 LAB
BACTERIA SPEC CULT: NORMAL
SERVICE CMNT-IMP: NORMAL

## 2024-02-03 NOTE — ED PROVIDER NOTES
History:   Diagnosis Date    ADHD (attention deficit hyperactivity disorder)     Asthma     Delivery normal     Eczema     Essential hypertension     GERD (gastroesophageal reflux disease)     Hearing reduced     Migraine     Ocular migraine     Trigeminal neuralgia of right side of face 2005    gamma knife; current flare up    Vertigo        SURGICAL HISTORY       Past Surgical History:   Procedure Laterality Date    BREAST SURGERY      OTHER SURGICAL HISTORY      tooth implant    OTHER SURGICAL HISTORY      cerclage    OTHER SURGICAL HISTORY      knee surgery    WISDOM TOOTH EXTRACTION         CURRENT MEDICATIONS       Discharge Medication List as of 2/1/2024  8:37 PM        CONTINUE these medications which have NOT CHANGED    Details   irbesartan (AVAPRO) 75 MG tablet Take by mouthHistorical Med      metoprolol tartrate (LOPRESSOR) 25 MG tablet TAKE 1 TABLET BY MOUTH TWICE A DAY WITH FOOD FOR 90 DAYSHistorical Med      estrogen, conjugated,-medroxyPROGESTERone (PREMPRO) 0.3-1.5 MG per tablet Take 1 tablet by mouth daily, Disp-90 tablet, R-3Normal      aspirin 81 MG EC tablet Take 1 tablet by mouth dailyHistorical Med      Cetirizine HCl (ZYRTEC ALLERGY) 10 MG CAPS Take 10 mg by mouth nightlyHistorical Med      methylphenidate (RITALIN) 10 MG tablet Take 1 tablet by mouth 3 times daily.Historical Med             ALLERGIES     Patient has no known allergies.    FAMILY HISTORY       Family History   Problem Relation Age of Onset    No Known Problems Mother     No Known Problems Father           SOCIAL HISTORY       Social History     Socioeconomic History    Marital status:      Spouse name: None    Number of children: None    Years of education: None    Highest education level: None   Tobacco Use    Smoking status: Never    Smokeless tobacco: Never   Substance and Sexual Activity    Alcohol use: Yes     Alcohol/week: 4.0 standard drinks of alcohol    Drug use: No       PHYSICAL EXAM    (up to 7 for level 4,  8 or more for level 5)     ED Triage Vitals [02/01/24 1700]   BP Temp Temp Source Pulse Respirations SpO2 Height Weight - Scale   (!) 142/77 98.2 °F (36.8 °C) Oral 66 18 100 % 1.626 m (5' 4\") 58.3 kg (128 lb 8.5 oz)       Body mass index is 22.06 kg/m².    Physical Exam    See [facility]    DIAGNOSTIC RESULTS     EKG: All EKG's are interpreted by the Emergency Department Physician who either signs or Co-signs this chart in the absence of a cardiologist.        RADIOLOGY:   Non-plain film images such as CT, Ultrasound and MRI are read by the radiologist.    Interpretation per the Radiologist below, if available at the time of this note:    XR CHEST PORTABLE   Final Result      No acute process on portable chest.              LABS:  Labs Reviewed   COMPREHENSIVE METABOLIC PANEL - Abnormal; Notable for the following components:       Result Value    Bun/Cre Ratio 22 (*)     Calcium 8.2 (*)     All other components within normal limits   URINALYSIS WITH MICROSCOPIC - Abnormal; Notable for the following components:    Ketones, Urine TRACE (*)     Epithelial Cells UA MODERATE (*)     BACTERIA, URINE 2+ (*)     Mucus, UA 1+ (*)     All other components within normal limits   CULTURE, URINE   CBC WITH AUTO DIFFERENTIAL   TROPONIN   MAGNESIUM   LIPASE       All other labs were within normal range or not returned as of this dictation.        PROCEDURES:  Unless otherwise noted below, none     Procedures    See [facility] for documentation of critical care time.       FINAL IMPRESSION      1. Vertigo          DISPOSITION/PLAN   DISPOSITION Decision To Discharge 02/01/2024 08:35:42 PM      PATIENT REFERRED TO:  Katelyn Dean MD  [address] [facility] [address] [phone]    Call in 1 day  Regarding your ER visit    Reji Nixon Jr., MD  [address] [facility]  [address] [place] [address] [phone]      Here's an ENT doctor for you to follow up with for vertigo, or your own ENT doctor if you already have an

## 2024-03-14 ENCOUNTER — HOSPITAL ENCOUNTER (OUTPATIENT)
Facility: HOSPITAL | Age: 64
Discharge: HOME | End: 2024-03-14
Attending: INTERNAL MEDICINE
Payer: COMMERCIAL

## 2024-03-14 DIAGNOSIS — R91.8 MULTIPLE PULMONARY NODULES: ICD-10-CM

## 2024-03-14 PROCEDURE — 71250 CT THORAX DX C-: CPT

## 2024-10-17 ENCOUNTER — APPOINTMENT (OUTPATIENT)
Facility: HOSPITAL | Age: 64
End: 2024-10-17
Payer: COMMERCIAL

## 2024-10-17 ENCOUNTER — HOSPITAL ENCOUNTER (EMERGENCY)
Facility: HOSPITAL | Age: 64
Discharge: HOME | End: 2024-10-17
Attending: EMERGENCY MEDICINE
Payer: COMMERCIAL

## 2024-10-17 VITALS
OXYGEN SATURATION: 96 % | DIASTOLIC BLOOD PRESSURE: 78 MMHG | SYSTOLIC BLOOD PRESSURE: 146 MMHG | BODY MASS INDEX: 22.55 KG/M2 | HEIGHT: 64 IN | RESPIRATION RATE: 16 BRPM | TEMPERATURE: 97.7 F | HEART RATE: 77 BPM | WEIGHT: 132.06 LBS

## 2024-10-17 DIAGNOSIS — M48.061 FORAMINAL STENOSIS OF LUMBAR REGION: Primary | ICD-10-CM

## 2024-10-17 DIAGNOSIS — M47.9 SPONDYLOSIS: ICD-10-CM

## 2024-10-17 DIAGNOSIS — S39.012A STRAIN OF LUMBAR REGION, INITIAL ENCOUNTER: ICD-10-CM

## 2024-10-17 PROCEDURE — 96372 THER/PROPH/DIAG INJ SC/IM: CPT

## 2024-10-17 PROCEDURE — 6370000000 HC RX 637 (ALT 250 FOR IP): Performed by: EMERGENCY MEDICINE

## 2024-10-17 PROCEDURE — 72131 CT LUMBAR SPINE W/O DYE: CPT

## 2024-10-17 PROCEDURE — 6360000002 HC RX W HCPCS

## 2024-10-17 PROCEDURE — 6370000000 HC RX 637 (ALT 250 FOR IP)

## 2024-10-17 PROCEDURE — 99284 EMERGENCY DEPT VISIT MOD MDM: CPT

## 2024-10-17 RX ORDER — MONTELUKAST SODIUM 4 MG/1
4 TABLET, CHEWABLE ORAL NIGHTLY
COMMUNITY

## 2024-10-17 RX ORDER — CYCLOBENZAPRINE HCL 10 MG
10 TABLET ORAL
Status: COMPLETED | OUTPATIENT
Start: 2024-10-17 | End: 2024-10-17

## 2024-10-17 RX ORDER — ACETAMINOPHEN 500 MG
1000 TABLET ORAL ONCE
Status: COMPLETED | OUTPATIENT
Start: 2024-10-17 | End: 2024-10-17

## 2024-10-17 RX ORDER — KETOROLAC TROMETHAMINE 30 MG/ML
15 INJECTION, SOLUTION INTRAMUSCULAR; INTRAVENOUS ONCE
Status: COMPLETED | OUTPATIENT
Start: 2024-10-17 | End: 2024-10-17

## 2024-10-17 RX ORDER — CYCLOBENZAPRINE HCL 10 MG
10 TABLET ORAL 3 TIMES DAILY PRN
Qty: 21 TABLET | Refills: 0 | Status: SHIPPED | OUTPATIENT
Start: 2024-10-17 | End: 2024-10-27

## 2024-10-17 RX ORDER — METHYLPREDNISOLONE 4 MG
4 TABLET, DOSE PACK ORAL SEE ADMIN INSTRUCTIONS
COMMUNITY

## 2024-10-17 RX ORDER — OXYCODONE HYDROCHLORIDE 5 MG/1
5 TABLET ORAL ONCE
Status: COMPLETED | OUTPATIENT
Start: 2024-10-17 | End: 2024-10-17

## 2024-10-17 RX ORDER — ONDANSETRON 2 MG/ML
4 INJECTION INTRAMUSCULAR; INTRAVENOUS ONCE
Status: DISCONTINUED | OUTPATIENT
Start: 2024-10-17 | End: 2024-10-17

## 2024-10-17 RX ORDER — ONDANSETRON 4 MG/1
4 TABLET, ORALLY DISINTEGRATING ORAL
Status: COMPLETED | OUTPATIENT
Start: 2024-10-17 | End: 2024-10-17

## 2024-10-17 RX ORDER — OXYCODONE HYDROCHLORIDE 5 MG/1
5 TABLET ORAL EVERY 8 HOURS PRN
Qty: 3 TABLET | Refills: 0 | Status: SHIPPED | OUTPATIENT
Start: 2024-10-17 | End: 2024-10-18

## 2024-10-17 RX ORDER — SCOLOPAMINE TRANSDERMAL SYSTEM 1 MG/1
1 PATCH, EXTENDED RELEASE TRANSDERMAL ONCE
Status: DISCONTINUED | OUTPATIENT
Start: 2024-10-17 | End: 2024-10-17 | Stop reason: HOSPADM

## 2024-10-17 RX ADMIN — KETOROLAC TROMETHAMINE 15 MG: 30 INJECTION, SOLUTION INTRAMUSCULAR at 15:53

## 2024-10-17 RX ADMIN — ACETAMINOPHEN 1000 MG: 500 TABLET ORAL at 16:54

## 2024-10-17 RX ADMIN — CYCLOBENZAPRINE 10 MG: 10 TABLET, FILM COATED ORAL at 15:55

## 2024-10-17 RX ADMIN — ONDANSETRON 4 MG: 4 TABLET, ORALLY DISINTEGRATING ORAL at 17:59

## 2024-10-17 RX ADMIN — OXYCODONE 5 MG: 5 TABLET ORAL at 17:59

## 2024-10-17 ASSESSMENT — PAIN SCALES - GENERAL
PAINLEVEL_OUTOF10: 9
PAINLEVEL_OUTOF10: 9
PAINLEVEL_OUTOF10: 6

## 2024-10-17 ASSESSMENT — ENCOUNTER SYMPTOMS: BACK PAIN: 1

## 2024-10-17 ASSESSMENT — PAIN DESCRIPTION - LOCATION
LOCATION: BACK

## 2024-10-17 ASSESSMENT — PAIN - FUNCTIONAL ASSESSMENT: PAIN_FUNCTIONAL_ASSESSMENT: 0-10

## 2024-10-17 ASSESSMENT — PAIN DESCRIPTION - ORIENTATION: ORIENTATION: LOWER

## 2024-10-17 NOTE — ED PROVIDER NOTES
[facility] EMERGENCY CTR  EMERGENCY DEPARTMENT ENCOUNTER      Pt Name: Isha Jaime  MRN: [id number]  Birthdate 1960  Date of evaluation: 10/17/2024  Provider: Ector Zambrano PA-C    CHIEF COMPLAINT       Chief Complaint   Patient presents with    Back Pain         HISTORY OF PRESENT ILLNESS   (Location/Symptom, Timing/Onset, Context/Setting, Quality, Duration, Modifying Factors, Severity)  Note limiting factors.   HPI  63-year-old female presenting to the emergency department today with left lower back pain.  Reports that last week she was bending over to  a 45 pound  When she lifted back up she had pain at the left lower back.  Reports since then she has had some pain, though this morning she reports bending over again to grab something when she came back up the pain got much worse.  She went to an orthopedic doctor before coming here today who prescribed her with steroids and told her she had strained her SI joint.  She says that she cannot get comfortable and rates her pain at a 10 out of 10.  Reports she is unable to take narcotics as they will make her throw up.  Reports that when she first injured it last week since then she has been using ice, heat, Tylenol with minimal relief.    Review of External Medical Records:     Nursing Notes were reviewed.    REVIEW OF SYSTEMS    (2-9 systems for level 4, 10 or more for level 5)     Review of Systems   Musculoskeletal:  Positive for back pain.       Except as noted above the remainder of the review of systems was reviewed and negative.       PAST MEDICAL HISTORY     Past Medical History:   Diagnosis Date    ADHD (attention deficit hyperactivity disorder)     Asthma     Delivery normal     Eczema     Essential hypertension     GERD (gastroesophageal reflux disease)     Hearing reduced     Migraine     Ocular migraine     Trigeminal neuralgia of right side of face 2005    gamma knife; current flare up    Vertigo          SURGICAL HISTORY

## 2024-10-17 NOTE — ED TRIAGE NOTES
Lower back pain x1 week. Pt reports that she was seen by Ortho MD today and was started on a steroid and was referred to PT. Pt reports she started the steroid today. Pt reports she went home and bent over today to pick something up and the pain has worsened. Pt reports she has gone a whole week with pain and she can't go another night without sleeping. Pt reports she \"does not want any suggestion narcotics because they cause her to throw up.\"

## 2024-10-17 NOTE — DISCHARGE INSTRUCTIONS
Today you are seen for back pain.  Please return if you have a worsening of your symptoms or develop bowel/bladder incontinence, numbness/tingling in the groin, weakness of the arms or legs.  Please keep your follow-up appointment with your orthopedic provider tomorrow.        EXAM:  CT LUMBAR SPINE WITHOUT  CONTRAST     INDICATION: left lower back pain. Reason for exam:->left lower back pain     COMPARISON: None.     CONTRAST:  None.     TECHNIQUE: Multislice helical CT of the lumbar spine was performed without  intravenous contrast administration.  Coronal and sagittal reformats were  generated.  CT dose reduction was achieved through use of a standardized  protocol tailored for this examination and automatic exposure control for dose  modulation.     FINDINGS:     The alignment is within normal limits. There is no fracture or compression  deformity. L5 bilateral pars defects.     Mild multilevel degenerative changes and disc disease. No spinal canal stenosis.  L3-L4 mild left, L4-L5 mild left, and L5-S1 mild bilateral foraminal stenosis.     Atherosclerosis. Cardiomegaly.        IMPRESSION:  1.  No acute fracture.  2.  Mild spondylitic changes and L5 bilateral pars defects. No spinal stenosis.  Several mild foraminal stenoses as above.

## 2025-01-21 ENCOUNTER — OFFICE VISIT (OUTPATIENT)
Age: 65
End: 2025-01-21
Payer: COMMERCIAL

## 2025-01-21 VITALS
OXYGEN SATURATION: 98 % | DIASTOLIC BLOOD PRESSURE: 62 MMHG | SYSTOLIC BLOOD PRESSURE: 112 MMHG | HEART RATE: 54 BPM | BODY MASS INDEX: 22.53 KG/M2 | WEIGHT: 132 LBS | HEIGHT: 64 IN

## 2025-01-21 DIAGNOSIS — Z01.419 WELL WOMAN EXAM: Primary | ICD-10-CM

## 2025-01-21 PROCEDURE — 99396 PREV VISIT EST AGE 40-64: CPT | Performed by: OBSTETRICS & GYNECOLOGY

## 2025-01-21 NOTE — PROGRESS NOTES
Annual exam    Here for annual with concerns noted in nursing note.  Doing well on low dose ERT.  Has successfully done bilateral breast implant replacements  Past Medical History:   Diagnosis Date    ADHD (attention deficit hyperactivity disorder)     Asthma     Delivery normal     Eczema     Essential hypertension     GERD (gastroesophageal reflux disease)     Hearing reduced     Migraine     Ocular migraine     Trigeminal neuralgia of right side of face 2005    gamma knife; current flare up    Vertigo      Past Surgical History:   Procedure Laterality Date    BREAST SURGERY      OTHER SURGICAL HISTORY      tooth implant    OTHER SURGICAL HISTORY      cerclage    OTHER SURGICAL HISTORY      knee surgery    WISDOM TOOTH EXTRACTION         Current Outpatient Medications   Medication Sig Dispense Refill    montelukast (SINGULAIR) 4 MG chewable tablet Take 1 tablet by mouth nightly      irbesartan (AVAPRO) 75 MG tablet Take by mouth      metoprolol tartrate (LOPRESSOR) 25 MG tablet TAKE 1 TABLET BY MOUTH TWICE A DAY WITH FOOD FOR 90 DAYS      estrogen, conjugated,-medroxyPROGESTERone (PREMPRO) 0.3-1.5 MG per tablet Take 1 tablet by mouth daily 90 tablet 3    aspirin 81 MG EC tablet Take 1 tablet by mouth daily      Cetirizine HCl (ZYRTEC ALLERGY) 10 MG CAPS Take 10 mg by mouth nightly      methylphenidate (RITALIN) 10 MG tablet Take 1 tablet by mouth 3 times daily.       No current facility-administered medications for this visit.     Allergies: Patient has no known allergies.     Tobacco History:  reports that she has never smoked. She has never used smokeless tobacco.  Alcohol Abuse:  reports current alcohol use of about 4.0 standard drinks of alcohol per week.  Drug Abuse:  reports no history of drug use.    Family Medical/Cancer History:   Family History   Problem Relation Age of Onset    No Known Problems Mother     No Known Problems Father         Review of Systems - History obtained from the

## 2025-01-21 NOTE — PROGRESS NOTES
Isha Jaime is a 64 y.o. female returns for an annual exam. Using HRT and wants to continue.    Chief Complaint   Patient presents with    Annual Exam     Pap: 1/2024 normal    Mammo:2024 normal    Colonoscopy:2023 normal    Bone Density: normal years ago      1. Have you been to the ER, urgent care clinic, or hospitalized since your last visit? No    2. Have you seen or consulted any other health care providers outside of the [facility] since your last visit? No    Stephanie Soriano LPN.

## 2025-06-27 ENCOUNTER — TRANSCRIBE ORDERS (OUTPATIENT)
Facility: HOSPITAL | Age: 65
End: 2025-06-27

## 2025-06-27 DIAGNOSIS — R93.89 ABNORMAL COMPUTED TOMOGRAPHY ANGIOGRAPHY (CTA): ICD-10-CM

## 2025-06-27 DIAGNOSIS — J34.89 FRONTAL SINUS PAIN: ICD-10-CM

## 2025-06-27 DIAGNOSIS — H90.42 SENSORINEURAL HEARING LOSS, UNILATERAL, LEFT EAR, WITH UNRESTRICTED HEARING ON THE CONTRALATERAL SIDE: ICD-10-CM

## 2025-06-27 DIAGNOSIS — Z78.9 NON-SMOKER: ICD-10-CM

## 2025-06-27 DIAGNOSIS — Z00.8 HEALTH EXAMINATION IN POPULATION SURVEY: ICD-10-CM

## 2025-06-27 DIAGNOSIS — I99.9 VASCULAR ABNORMALITY: ICD-10-CM

## 2025-06-27 DIAGNOSIS — H93.13 TINNITUS, BILATERAL: Primary | ICD-10-CM

## 2025-08-14 ENCOUNTER — HOSPITAL ENCOUNTER (OUTPATIENT)
Facility: HOSPITAL | Age: 65
Discharge: HOME | End: 2025-08-17
Attending: OTOLARYNGOLOGY
Payer: COMMERCIAL

## 2025-08-14 DIAGNOSIS — J34.89 FRONTAL SINUS PAIN: ICD-10-CM

## 2025-08-14 DIAGNOSIS — H90.42 SENSORINEURAL HEARING LOSS, UNILATERAL, LEFT EAR, WITH UNRESTRICTED HEARING ON THE CONTRALATERAL SIDE: ICD-10-CM

## 2025-08-14 DIAGNOSIS — R93.89 ABNORMAL COMPUTED TOMOGRAPHY ANGIOGRAPHY (CTA): ICD-10-CM

## 2025-08-14 DIAGNOSIS — I99.9 VASCULAR ABNORMALITY: ICD-10-CM

## 2025-08-14 DIAGNOSIS — Z00.8 HEALTH EXAMINATION IN POPULATION SURVEY: ICD-10-CM

## 2025-08-14 DIAGNOSIS — H93.13 TINNITUS, BILATERAL: ICD-10-CM

## 2025-08-14 DIAGNOSIS — Z78.9 NON-SMOKER: ICD-10-CM

## 2025-08-14 PROCEDURE — 6360000004 HC RX CONTRAST MEDICATION: Performed by: RADIOLOGY

## 2025-08-14 PROCEDURE — 70553 MRI BRAIN STEM W/O & W/DYE: CPT

## 2025-08-14 PROCEDURE — A9579 GAD-BASE MR CONTRAST NOS,1ML: HCPCS | Performed by: RADIOLOGY

## 2025-08-14 RX ORDER — GADOTERIDOL 279.3 MG/ML
12 INJECTION INTRAVENOUS
Status: COMPLETED | OUTPATIENT
Start: 2025-08-14 | End: 2025-08-14

## 2025-08-14 RX ADMIN — GADOTERIDOL 12 ML: 279.3 INJECTION, SOLUTION INTRAVENOUS at 20:19
